# Patient Record
Sex: MALE | Race: WHITE | NOT HISPANIC OR LATINO | Employment: OTHER | URBAN - METROPOLITAN AREA
[De-identification: names, ages, dates, MRNs, and addresses within clinical notes are randomized per-mention and may not be internally consistent; named-entity substitution may affect disease eponyms.]

---

## 2024-07-27 ENCOUNTER — APPOINTMENT (OUTPATIENT)
Dept: GENERAL RADIOLOGY | Facility: HOSPITAL | Age: 64
End: 2024-07-27
Payer: MEDICARE

## 2024-07-27 ENCOUNTER — HOSPITAL ENCOUNTER (INPATIENT)
Facility: HOSPITAL | Age: 64
LOS: 6 days | Discharge: HOME | End: 2024-08-02
Attending: FAMILY MEDICINE | Admitting: INTERNAL MEDICINE
Payer: MEDICARE

## 2024-07-27 DIAGNOSIS — F41.9 ANXIETY DISORDER, UNSPECIFIED TYPE: ICD-10-CM

## 2024-07-27 DIAGNOSIS — J96.22 ACUTE ON CHRONIC RESPIRATORY FAILURE WITH HYPERCAPNIA: ICD-10-CM

## 2024-07-27 DIAGNOSIS — J44.1 COPD WITH ACUTE EXACERBATION: Primary | ICD-10-CM

## 2024-07-27 DIAGNOSIS — E87.29 RESPIRATORY ACIDOSIS: ICD-10-CM

## 2024-07-27 PROBLEM — R91.1 LUNG NODULE: Status: ACTIVE | Noted: 2024-07-27

## 2024-07-27 PROBLEM — I10 HTN (HYPERTENSION): Status: ACTIVE | Noted: 2024-07-27

## 2024-07-27 PROBLEM — D64.9 ANEMIA: Status: ACTIVE | Noted: 2024-07-27

## 2024-07-27 PROBLEM — E78.5 HLD (HYPERLIPIDEMIA): Status: ACTIVE | Noted: 2024-07-27

## 2024-07-27 PROBLEM — J96.20 ACUTE-ON-CHRONIC RESPIRATORY FAILURE: Status: ACTIVE | Noted: 2024-07-27

## 2024-07-27 LAB
ALBUMIN SERPL-MCNC: 3.7 G/DL (ref 3.5–5.2)
ALBUMIN/GLOB SERPL: 2.2 G/DL
ALP SERPL-CCNC: 53 U/L (ref 39–117)
ALT SERPL W P-5'-P-CCNC: 9 U/L (ref 1–41)
ANION GAP SERPL CALCULATED.3IONS-SCNC: 3 MMOL/L (ref 5–15)
ARTERIAL PATENCY WRIST A: ABNORMAL
ARTERIAL PATENCY WRIST A: ABNORMAL
AST SERPL-CCNC: 13 U/L (ref 1–40)
ATMOSPHERIC PRESS: ABNORMAL MM[HG]
ATMOSPHERIC PRESS: ABNORMAL MM[HG]
BASE EXCESS BLDA CALC-SCNC: 8.6 MMOL/L (ref 0–2)
BASE EXCESS BLDA CALC-SCNC: 9.1 MMOL/L (ref 0–2)
BASOPHILS # BLD AUTO: 0.01 10*3/MM3 (ref 0–0.2)
BASOPHILS NFR BLD AUTO: 0.1 % (ref 0–1.5)
BDY SITE: ABNORMAL
BDY SITE: ABNORMAL
BILIRUB SERPL-MCNC: 0.3 MG/DL (ref 0–1.2)
BODY TEMPERATURE: 37
BODY TEMPERATURE: 37
BUN SERPL-MCNC: 9 MG/DL (ref 8–23)
BUN/CREAT SERPL: 18.4 (ref 7–25)
CALCIUM SPEC-SCNC: 8.5 MG/DL (ref 8.6–10.5)
CHLORIDE SERPL-SCNC: 94 MMOL/L (ref 98–107)
CO2 BLDA-SCNC: 39.5 MMOL/L (ref 22–33)
CO2 BLDA-SCNC: 40.3 MMOL/L (ref 22–33)
CO2 SERPL-SCNC: 39 MMOL/L (ref 22–29)
COHGB MFR BLD: 1.4 % (ref 0–2)
COHGB MFR BLD: 1.4 % (ref 0–2)
CREAT SERPL-MCNC: 0.49 MG/DL (ref 0.76–1.27)
DEPRECATED RDW RBC AUTO: 44.3 FL (ref 37–54)
EGFRCR SERPLBLD CKD-EPI 2021: 116 ML/MIN/1.73
EOSINOPHIL # BLD AUTO: 0.02 10*3/MM3 (ref 0–0.4)
EOSINOPHIL NFR BLD AUTO: 0.2 % (ref 0.3–6.2)
ERYTHROCYTE [DISTWIDTH] IN BLOOD BY AUTOMATED COUNT: 13.2 % (ref 12.3–15.4)
GLOBULIN UR ELPH-MCNC: 1.7 GM/DL
GLUCOSE SERPL-MCNC: 126 MG/DL (ref 65–99)
HCO3 BLDA-SCNC: 37.2 MMOL/L (ref 20–26)
HCO3 BLDA-SCNC: 37.9 MMOL/L (ref 20–26)
HCT VFR BLD AUTO: 30.7 % (ref 37.5–51)
HCT VFR BLD CALC: 32.1 % (ref 38–51)
HCT VFR BLD CALC: 33 % (ref 38–51)
HGB BLD-MCNC: 10.2 G/DL (ref 13–17.7)
HGB BLDA-MCNC: 10.5 G/DL (ref 13.5–17.5)
HGB BLDA-MCNC: 10.8 G/DL (ref 13.5–17.5)
IMM GRANULOCYTES # BLD AUTO: 0.04 10*3/MM3 (ref 0–0.05)
IMM GRANULOCYTES NFR BLD AUTO: 0.4 % (ref 0–0.5)
INHALED O2 CONCENTRATION: 28 %
INHALED O2 CONCENTRATION: 28 %
LYMPHOCYTES # BLD AUTO: 0.4 10*3/MM3 (ref 0.7–3.1)
LYMPHOCYTES NFR BLD AUTO: 4.4 % (ref 19.6–45.3)
Lab: ABNORMAL
Lab: ABNORMAL
MCH RBC QN AUTO: 30.2 PG (ref 26.6–33)
MCHC RBC AUTO-ENTMCNC: 33.2 G/DL (ref 31.5–35.7)
MCV RBC AUTO: 90.8 FL (ref 79–97)
METHGB BLD QL: 0 % (ref 0–1.5)
METHGB BLD QL: 0.3 % (ref 0–1.5)
MODALITY: ABNORMAL
MODALITY: ABNORMAL
MONOCYTES # BLD AUTO: 0.67 10*3/MM3 (ref 0.1–0.9)
MONOCYTES NFR BLD AUTO: 7.5 % (ref 5–12)
NEUTROPHILS NFR BLD AUTO: 7.85 10*3/MM3 (ref 1.7–7)
NEUTROPHILS NFR BLD AUTO: 87.4 % (ref 42.7–76)
NOTIFIED BY: ABNORMAL
NOTIFIED BY: ABNORMAL
NOTIFIED WHO: ABNORMAL
NOTIFIED WHO: ABNORMAL
NRBC BLD AUTO-RTO: 0 /100 WBC (ref 0–0.2)
OXYHGB MFR BLDV: 95.2 % (ref 94–99)
OXYHGB MFR BLDV: 96.4 % (ref 94–99)
PCO2 BLDA: 76.1 MM HG (ref 35–45)
PCO2 BLDA: 77.8 MM HG (ref 35–45)
PCO2 TEMP ADJ BLD: 76.1 MM HG (ref 35–48)
PCO2 TEMP ADJ BLD: 77.8 MM HG (ref 35–48)
PH BLDA: 7.3 PH UNITS (ref 7.35–7.45)
PH BLDA: 7.3 PH UNITS (ref 7.35–7.45)
PH, TEMP CORRECTED: 7.3 PH UNITS
PH, TEMP CORRECTED: 7.3 PH UNITS
PLATELET # BLD AUTO: 244 10*3/MM3 (ref 140–450)
PMV BLD AUTO: 9.7 FL (ref 6–12)
PO2 BLDA: 80 MM HG (ref 83–108)
PO2 BLDA: 94.5 MM HG (ref 83–108)
PO2 TEMP ADJ BLD: 80 MM HG (ref 83–108)
PO2 TEMP ADJ BLD: 94.5 MM HG (ref 83–108)
POTASSIUM SERPL-SCNC: 3.5 MMOL/L (ref 3.5–5.2)
PROCALCITONIN SERPL-MCNC: 0.05 NG/ML (ref 0–0.25)
PROT SERPL-MCNC: 5.4 G/DL (ref 6–8.5)
RBC # BLD AUTO: 3.38 10*6/MM3 (ref 4.14–5.8)
SODIUM SERPL-SCNC: 136 MMOL/L (ref 136–145)
VENTILATOR MODE: ABNORMAL
VENTILATOR MODE: ABNORMAL
WBC NRBC COR # BLD AUTO: 8.99 10*3/MM3 (ref 3.4–10.8)

## 2024-07-27 PROCEDURE — 82375 ASSAY CARBOXYHB QUANT: CPT

## 2024-07-27 PROCEDURE — 94799 UNLISTED PULMONARY SVC/PX: CPT

## 2024-07-27 PROCEDURE — 82805 BLOOD GASES W/O2 SATURATION: CPT

## 2024-07-27 PROCEDURE — 36600 WITHDRAWAL OF ARTERIAL BLOOD: CPT

## 2024-07-27 PROCEDURE — 99223 1ST HOSP IP/OBS HIGH 75: CPT | Performed by: INTERNAL MEDICINE

## 2024-07-27 PROCEDURE — 80053 COMPREHEN METABOLIC PANEL: CPT | Performed by: INTERNAL MEDICINE

## 2024-07-27 PROCEDURE — 83050 HGB METHEMOGLOBIN QUAN: CPT

## 2024-07-27 PROCEDURE — 25010000002 METHYLPREDNISOLONE PER 125 MG: Performed by: INTERNAL MEDICINE

## 2024-07-27 PROCEDURE — 71045 X-RAY EXAM CHEST 1 VIEW: CPT

## 2024-07-27 PROCEDURE — 84145 PROCALCITONIN (PCT): CPT | Performed by: INTERNAL MEDICINE

## 2024-07-27 PROCEDURE — 94761 N-INVAS EAR/PLS OXIMETRY MLT: CPT

## 2024-07-27 PROCEDURE — 25010000002 HEPARIN (PORCINE) PER 1000 UNITS: Performed by: INTERNAL MEDICINE

## 2024-07-27 PROCEDURE — 94660 CPAP INITIATION&MGMT: CPT

## 2024-07-27 PROCEDURE — 85025 COMPLETE CBC W/AUTO DIFF WBC: CPT | Performed by: INTERNAL MEDICINE

## 2024-07-27 RX ORDER — CLONAZEPAM 0.5 MG/1
0.25 TABLET ORAL DAILY PRN
Status: DISCONTINUED | OUTPATIENT
Start: 2024-07-27 | End: 2024-07-28

## 2024-07-27 RX ORDER — POLYETHYLENE GLYCOL 3350 17 G/17G
17 POWDER, FOR SOLUTION ORAL DAILY
COMMUNITY

## 2024-07-27 RX ORDER — ACETAMINOPHEN 160 MG/5ML
650 SOLUTION ORAL EVERY 4 HOURS PRN
Status: DISCONTINUED | OUTPATIENT
Start: 2024-07-27 | End: 2024-08-02 | Stop reason: HOSPADM

## 2024-07-27 RX ORDER — MEGESTROL ACETATE 20 MG/1
40 TABLET ORAL DAILY
Status: DISCONTINUED | OUTPATIENT
Start: 2024-07-27 | End: 2024-08-02 | Stop reason: HOSPADM

## 2024-07-27 RX ORDER — POLYETHYLENE GLYCOL 3350 17 G/17G
17 POWDER, FOR SOLUTION ORAL DAILY PRN
Status: DISCONTINUED | OUTPATIENT
Start: 2024-07-27 | End: 2024-08-02 | Stop reason: HOSPADM

## 2024-07-27 RX ORDER — LEVALBUTEROL INHALATION SOLUTION 0.63 MG/3ML
1 SOLUTION RESPIRATORY (INHALATION) EVERY 4 HOURS PRN
Status: ON HOLD | COMMUNITY
End: 2024-08-02

## 2024-07-27 RX ORDER — METHYLPREDNISOLONE SODIUM SUCCINATE 125 MG/2ML
60 INJECTION, POWDER, LYOPHILIZED, FOR SOLUTION INTRAMUSCULAR; INTRAVENOUS EVERY 24 HOURS
Status: DISCONTINUED | OUTPATIENT
Start: 2024-07-27 | End: 2024-07-28

## 2024-07-27 RX ORDER — DOXYCYCLINE 100 MG/1
100 CAPSULE ORAL EVERY 12 HOURS SCHEDULED
Status: COMPLETED | OUTPATIENT
Start: 2024-07-27 | End: 2024-07-31

## 2024-07-27 RX ORDER — BISACODYL 5 MG/1
5 TABLET, DELAYED RELEASE ORAL DAILY PRN
Status: DISCONTINUED | OUTPATIENT
Start: 2024-07-27 | End: 2024-08-02 | Stop reason: HOSPADM

## 2024-07-27 RX ORDER — ONDANSETRON 4 MG/1
4 TABLET, ORALLY DISINTEGRATING ORAL EVERY 6 HOURS PRN
Status: DISCONTINUED | OUTPATIENT
Start: 2024-07-27 | End: 2024-08-02 | Stop reason: HOSPADM

## 2024-07-27 RX ORDER — BISACODYL 10 MG
10 SUPPOSITORY, RECTAL RECTAL DAILY PRN
Status: DISCONTINUED | OUTPATIENT
Start: 2024-07-27 | End: 2024-08-02 | Stop reason: HOSPADM

## 2024-07-27 RX ORDER — SODIUM CHLORIDE 0.9 % (FLUSH) 0.9 %
1-10 SYRINGE (ML) INJECTION AS NEEDED
Status: DISCONTINUED | OUTPATIENT
Start: 2024-07-27 | End: 2024-08-02 | Stop reason: HOSPADM

## 2024-07-27 RX ORDER — MONTELUKAST SODIUM 10 MG/1
10 TABLET ORAL NIGHTLY
Status: DISCONTINUED | OUTPATIENT
Start: 2024-07-27 | End: 2024-08-02 | Stop reason: HOSPADM

## 2024-07-27 RX ORDER — HEPARIN SODIUM 5000 [USP'U]/ML
5000 INJECTION, SOLUTION INTRAVENOUS; SUBCUTANEOUS EVERY 8 HOURS SCHEDULED
Status: DISCONTINUED | OUTPATIENT
Start: 2024-07-27 | End: 2024-08-02 | Stop reason: HOSPADM

## 2024-07-27 RX ORDER — ACETAMINOPHEN 325 MG/1
650 TABLET ORAL EVERY 4 HOURS PRN
Status: DISCONTINUED | OUTPATIENT
Start: 2024-07-27 | End: 2024-08-02 | Stop reason: HOSPADM

## 2024-07-27 RX ORDER — SODIUM CHLORIDE 0.9 % (FLUSH) 0.9 %
10 SYRINGE (ML) INJECTION EVERY 12 HOURS SCHEDULED
Status: DISCONTINUED | OUTPATIENT
Start: 2024-07-27 | End: 2024-08-02 | Stop reason: HOSPADM

## 2024-07-27 RX ORDER — LACTULOSE 10 G/15ML
30 SOLUTION ORAL DAILY PRN
Status: DISCONTINUED | OUTPATIENT
Start: 2024-07-27 | End: 2024-08-02 | Stop reason: HOSPADM

## 2024-07-27 RX ORDER — IPRATROPIUM BROMIDE AND ALBUTEROL SULFATE 2.5; .5 MG/3ML; MG/3ML
3 SOLUTION RESPIRATORY (INHALATION)
Status: DISCONTINUED | OUTPATIENT
Start: 2024-07-27 | End: 2024-08-02 | Stop reason: HOSPADM

## 2024-07-27 RX ORDER — AMOXICILLIN 250 MG
2 CAPSULE ORAL 2 TIMES DAILY PRN
Status: DISCONTINUED | OUTPATIENT
Start: 2024-07-27 | End: 2024-08-02 | Stop reason: HOSPADM

## 2024-07-27 RX ORDER — ALBUTEROL SULFATE 2.5 MG/3ML
2.5 SOLUTION RESPIRATORY (INHALATION) EVERY 6 HOURS PRN
Status: DISCONTINUED | OUTPATIENT
Start: 2024-07-27 | End: 2024-08-02 | Stop reason: HOSPADM

## 2024-07-27 RX ORDER — MEGESTROL ACETATE 40 MG/1
40 TABLET ORAL DAILY
COMMUNITY

## 2024-07-27 RX ORDER — MONTELUKAST SODIUM 10 MG/1
10 TABLET ORAL NIGHTLY
COMMUNITY

## 2024-07-27 RX ORDER — VILAZODONE HYDROCHLORIDE 40 MG/1
20 TABLET ORAL DAILY
Status: DISCONTINUED | OUTPATIENT
Start: 2024-07-27 | End: 2024-08-02 | Stop reason: HOSPADM

## 2024-07-27 RX ORDER — TIOTROPIUM BROMIDE 18 UG/1
1 CAPSULE ORAL; RESPIRATORY (INHALATION)
COMMUNITY

## 2024-07-27 RX ORDER — CLONAZEPAM 0.5 MG/1
0.5 TABLET ORAL 2 TIMES DAILY PRN
Status: ON HOLD | COMMUNITY
End: 2024-08-02

## 2024-07-27 RX ORDER — LEVALBUTEROL TARTRATE 45 UG/1
1-2 AEROSOL, METERED ORAL EVERY 4 HOURS PRN
COMMUNITY

## 2024-07-27 RX ORDER — LISINOPRIL 10 MG/1
10 TABLET ORAL DAILY
Status: DISCONTINUED | OUTPATIENT
Start: 2024-07-28 | End: 2024-08-02 | Stop reason: HOSPADM

## 2024-07-27 RX ORDER — ONDANSETRON 2 MG/ML
4 INJECTION INTRAMUSCULAR; INTRAVENOUS EVERY 6 HOURS PRN
Status: DISCONTINUED | OUTPATIENT
Start: 2024-07-27 | End: 2024-08-02 | Stop reason: HOSPADM

## 2024-07-27 RX ORDER — LISINOPRIL 10 MG/1
10 TABLET ORAL DAILY
COMMUNITY

## 2024-07-27 RX ORDER — SODIUM CHLORIDE 9 MG/ML
40 INJECTION, SOLUTION INTRAVENOUS AS NEEDED
Status: DISCONTINUED | OUTPATIENT
Start: 2024-07-27 | End: 2024-08-02 | Stop reason: HOSPADM

## 2024-07-27 RX ORDER — LACTULOSE 10 G/15ML
30 SOLUTION ORAL EVERY 4 HOURS
Status: DISPENSED | OUTPATIENT
Start: 2024-07-27 | End: 2024-07-27

## 2024-07-27 RX ORDER — VILAZODONE HYDROCHLORIDE 20 MG/1
20 TABLET ORAL DAILY
COMMUNITY

## 2024-07-27 RX ORDER — ACETAMINOPHEN 650 MG/1
650 SUPPOSITORY RECTAL EVERY 4 HOURS PRN
Status: DISCONTINUED | OUTPATIENT
Start: 2024-07-27 | End: 2024-08-02 | Stop reason: HOSPADM

## 2024-07-27 RX ADMIN — HEPARIN SODIUM 5000 UNITS: 5000 INJECTION INTRAVENOUS; SUBCUTANEOUS at 13:05

## 2024-07-27 RX ADMIN — MONTELUKAST 10 MG: 10 TABLET, FILM COATED ORAL at 20:33

## 2024-07-27 RX ADMIN — DOXYCYCLINE 100 MG: 100 CAPSULE ORAL at 13:05

## 2024-07-27 RX ADMIN — MEGESTROL ACETATE 40 MG: 20 TABLET ORAL at 13:06

## 2024-07-27 RX ADMIN — IPRATROPIUM BROMIDE AND ALBUTEROL SULFATE 3 ML: 2.5; .5 SOLUTION RESPIRATORY (INHALATION) at 16:23

## 2024-07-27 RX ADMIN — DOXYCYCLINE 100 MG: 100 CAPSULE ORAL at 20:33

## 2024-07-27 RX ADMIN — Medication 10 ML: at 13:20

## 2024-07-27 RX ADMIN — METHYLPREDNISOLONE SODIUM SUCCINATE 60 MG: 125 INJECTION, POWDER, FOR SOLUTION INTRAMUSCULAR; INTRAVENOUS at 13:05

## 2024-07-27 RX ADMIN — Medication 10 ML: at 20:33

## 2024-07-27 RX ADMIN — VILAZODONE HYDROCHLORIDE 20 MG: 40 TABLET, FILM COATED ORAL at 13:19

## 2024-07-27 RX ADMIN — HEPARIN SODIUM 5000 UNITS: 5000 INJECTION INTRAVENOUS; SUBCUTANEOUS at 23:03

## 2024-07-27 RX ADMIN — LACTULOSE 30 G: 20 SOLUTION ORAL at 13:04

## 2024-07-27 RX ADMIN — IPRATROPIUM BROMIDE AND ALBUTEROL SULFATE 3 ML: 2.5; .5 SOLUTION RESPIRATORY (INHALATION) at 18:58

## 2024-07-27 NOTE — H&P
"    [facility] Medicine Services  HISTORY AND PHYSICAL    Patient Name: Anshu Engel  : 1960  MRN: [id number]  Primary Care Physician: Provider, No Known  Date of admission: 2024      Subjective   Subjective     Chief Complaint:  SOA    HPI:  Anshu Engel is a 64 y.o. male with PMH of HTN, HLD, known (non- cancerous) lung nodules (previously followed by Dr. Heck at ), and COPD on 2L BNC at baseline who presented as a direct admit from [facility] per family request.  Pt is a poor historian currently and just seems fixated on having a BM and getting a shower- most of the history is obtained via phone conversation with patient's fiance.  Per fiance, pt has had increasing O2 demands \"for awhile\" and he will randomly change his O2 either up or down- she's not sure when nor how often he has been doing this.  On the day of admission to the OSH (24), pt was more lethargic and confused, so she brought him to the ED. He was found to have a significant respiratory acidosis/hypercapnia with CO2 of >100. He was placed on Bipap and improved.  Since admission, he had been improving on intermittent bipap.  Family just wanted him at [facility] as they prefer this facility.  Of note, no infectious etiology was found at the OSH.      Personal History     Past Medical History:   Diagnosis Date    Allergic     Elevated cholesterol            Past Surgical History:   Procedure Laterality Date    ANKLE SURGERY Left     fracture repair       Family History: family history includes Heart disease in his father and mother.     Social History:  reports that he quit smoking about 6 months ago. His smoking use included cigarettes. He has been exposed to tobacco smoke. He does not have any smokeless tobacco history on file. He reports that he does not currently use alcohol after a past usage of about 12.0 standard drinks of alcohol per week. He reports that he does not currently " use drugs.  Social History     Social History Narrative    Not on file       Medications:  Available home medication information reviewed.  clonazePAM, levalbuterol, lisinopril, megestrol, montelukast, polyethylene glycol, tiotropium, and vilazodone    Allergies   Allergen Reactions    Penicillins Rash       Objective   Objective     Vital Signs:   Temp:  [98.9 °F (37.2 °C)] 98.9 °F (37.2 °C)  Heart Rate:  [81-88] 81  Resp:  [16] 16  BP: (108)/(70) 108/70  Flow (L/min):  [2] 2       Physical Exam   Constitutional: Awake, alert, chronically ill appearing WM on Bipap  Eyes: PERRLA, sclerae anicteric, no conjunctival injection  HENT: NCAT, mucous membranes moist  Neck: Supple, no thyromegaly, no lymphadenopathy, trachea midline  Respiratory: Clear to auscultation bilaterally, nonlabored respirations   Cardiovascular: RRR, no murmurs, rubs, or gallops, palpable pedal pulses bilaterally  Gastrointestinal: Positive bowel sounds, soft, nontender, nondistended  Musculoskeletal: No bilateral ankle edema, no clubbing or cyanosis to extremities  Psychiatric: Appropriate affect, cooperative  Neurologic: Oriented x 3, strength symmetric in all extremities, Cranial Nerves grossly intact to confrontation, speech clear  Skin: No rashes     Result Review:  I have personally reviewed the results from the time of this admission to 7/27/2024 12:12 EDT and agree with these findings:  [x]  Laboratory list / accordion  [x]  Microbiology  [x]  Radiology  []  EKG/Telemetry   []  Cardiology/Vascular   []  Pathology  [x]  Old records  []  Other:  Most notable findings include: see assessment and plan       LAB RESULTS:      Lab 07/27/24  1059   WBC 8.99   HEMOGLOBIN 10.2*   HEMATOCRIT 30.7*   PLATELETS 244   NEUTROS ABS 7.85*   IMMATURE GRANS (ABS) 0.04   LYMPHS ABS 0.40*   MONOS ABS 0.67   EOS ABS 0.02   MCV 90.8   PROCALCITONIN 0.05         Lab 07/27/24  1059   SODIUM 136   POTASSIUM 3.5   CHLORIDE 94*   CO2 39.0*   ANION GAP 3.0*   BUN 9    CREATININE 0.49*   EGFR 116.0   GLUCOSE 126*   CALCIUM 8.5*         Lab 07/27/24  1059   TOTAL PROTEIN 5.4*   ALBUMIN 3.7   GLOBULIN 1.7   ALT (SGPT) 9   AST (SGOT) 13   BILIRUBIN 0.3   ALK PHOS 53                     Lab 07/27/24  1059   PH, ARTERIAL 7.297*   PCO2, ARTERIAL 76.1*   PO2 ART 80.0*   FIO2 28   HCO3 ART 37.2*   BASE EXCESS ART 8.6*   CARBOXYHEMOGLOBIN 1.4         Microbiology Results (last 10 days)       ** No results found for the last 240 hours. **            XR Chest 1 View    Result Date: 7/27/2024  XR CHEST 1 VW Date of Exam: 7/27/2024 10:59 AM EDT Indication: hypoxia Comparison: None available. Findings: Unremarkable cardiomediastinal silhouette. There are chronic parenchymal lung changes. No focal airspace consolidation, pleural effusion, or pneumothorax. Healed right posterior rib fractures. No acute osseous abnormality.     Impression: Impression: Chronic findings as above. No acute cardiopulmonary abnormality. Electronically Signed: Daniel Pabon MD  7/27/2024 11:14 AM EDT  Workstation ID: [id number]         Assessment & Plan   Assessment & Plan       COPD with acute exacerbation    Acute on chronic respiratory failure with hypercapnia    Respiratory acidosis    Anemia    HTN (hypertension)    HLD (hyperlipidemia)    Lung nodule    Acute-on-chronic respiratory failure      Mr. Flowers is a 65 yo WM with PMH of HTN, HLD, known (non- cancerous) lung nodules (previously followed by Dr. Heck at ), and COPD on 2L BNC at baseline who presented as a direct admit from [facility] per family request.    Plan:    Acute on chronic hypercapnic/hypoxic respiratory failure  Respiratory acidosis  COPD with acute exacerbation  -- pH of 7.29 on admission with pCO2 in the 70s  -- Bipap for time being, repeat ABG at 2pm and wean off Bipap as tolerated  -- family requested Pulmonary consult, this has been placed for the am  -- continue with steroids (will do IV for now), doxycycline for  antiinflammatory effects and scheduled and PRN Duonebs  -- consult Palliative for GOC. Pt is DNR/DNI per fiance (he was unable to tell me and deferred to her)    HTN  HLD  -- continue home meds    Chronic Benzo use  -- apparently his psychiatrist has been weaning him off his Clonazepam in the recent months. He was on 0.5 mg BID at time of admission to OSH, however, UDS was negative at that time. Suspect some element of withdrawal from benzos (suspect he took more than he was prescribed and then was withdrawing?) upon admission to OSH  -- will continue with Clonazepam 0.25 mg daily PRN for now and would taper off as able    Mood Disorder  -- continue Trintellix from home meds    Total time spent: 70 minutes  Time spent includes time reviewing chart, face-to-face time, counseling patient/family/caregiver, ordering medications/tests/procedures, communicating with other health care professionals, documenting clinical information in the electronic health record, and coordination of care.       VTE Prophylaxis:  Pharmacologic VTE prophylaxis orders are present.          CODE STATUS:    Code Status and Medical Interventions: No CPR (Do Not Attempt to Resuscitate); Limited Support; No intubation (DNI), No vasopressors, No dialysis, No cardioversion, No artificial nutrition   Ordered at: 07/27/24 1210     Medical Intervention Limits:    No intubation (DNI)    No vasopressors    No dialysis    No cardioversion    No artificial nutrition     Level Of Support Discussed With:    Health Care Surrogate     Code Status (Patient has no pulse and is not breathing):    No CPR (Do Not Attempt to Resuscitate)     Medical Interventions (Patient has pulse or is breathing):    Limited Support       Expected Discharge   Expected Discharge Date: 7/30/2024; Expected Discharge Time:      Mita Cruz MD  07/27/24

## 2024-07-28 LAB
ANION GAP SERPL CALCULATED.3IONS-SCNC: 6 MMOL/L (ref 5–15)
ARTERIAL PATENCY WRIST A: POSITIVE
ATMOSPHERIC PRESS: ABNORMAL MM[HG]
BASE EXCESS BLDA CALC-SCNC: 11.9 MMOL/L (ref 0–2)
BDY SITE: ABNORMAL
BODY TEMPERATURE: 37
BUN SERPL-MCNC: 9 MG/DL (ref 8–23)
BUN/CREAT SERPL: 15.8 (ref 7–25)
CALCIUM SPEC-SCNC: 8.8 MG/DL (ref 8.6–10.5)
CHLORIDE SERPL-SCNC: 93 MMOL/L (ref 98–107)
CHOLEST SERPL-MCNC: 177 MG/DL (ref 0–200)
CO2 BLDA-SCNC: 40.1 MMOL/L (ref 22–33)
CO2 SERPL-SCNC: 38 MMOL/L (ref 22–29)
COHGB MFR BLD: 1.4 % (ref 0–2)
CREAT SERPL-MCNC: 0.57 MG/DL (ref 0.76–1.27)
DEPRECATED RDW RBC AUTO: 41.7 FL (ref 37–54)
EGFRCR SERPLBLD CKD-EPI 2021: 110.8 ML/MIN/1.73
EPAP: 6
ERYTHROCYTE [DISTWIDTH] IN BLOOD BY AUTOMATED COUNT: 13 % (ref 12.3–15.4)
GLUCOSE SERPL-MCNC: 99 MG/DL (ref 65–99)
HBA1C MFR BLD: 4.5 % (ref 4.8–5.6)
HCO3 BLDA-SCNC: 38.3 MMOL/L (ref 20–26)
HCT VFR BLD AUTO: 29.3 % (ref 37.5–51)
HCT VFR BLD CALC: 31.5 % (ref 38–51)
HDLC SERPL-MCNC: 50 MG/DL (ref 40–60)
HGB BLD-MCNC: 10 G/DL (ref 13–17.7)
HGB BLDA-MCNC: 10.3 G/DL (ref 13.5–17.5)
INHALED O2 CONCENTRATION: 28 %
IPAP: 18
LDLC SERPL CALC-MCNC: 109 MG/DL (ref 0–100)
LDLC/HDLC SERPL: 2.14 {RATIO}
MCH RBC QN AUTO: 30 PG (ref 26.6–33)
MCHC RBC AUTO-ENTMCNC: 34.1 G/DL (ref 31.5–35.7)
MCV RBC AUTO: 88 FL (ref 79–97)
METHGB BLD QL: 0.1 % (ref 0–1.5)
MODALITY: ABNORMAL
OXYHGB MFR BLDV: 96.1 % (ref 94–99)
PAW @ PEAK INSP FLOW SETTING VENT: 0 CMH2O
PCO2 BLDA: 59 MM HG (ref 35–45)
PCO2 TEMP ADJ BLD: 59 MM HG (ref 35–48)
PH BLDA: 7.42 PH UNITS (ref 7.35–7.45)
PH, TEMP CORRECTED: 7.42 PH UNITS
PLATELET # BLD AUTO: 271 10*3/MM3 (ref 140–450)
PMV BLD AUTO: 9.9 FL (ref 6–12)
PO2 BLDA: 80.6 MM HG (ref 83–108)
PO2 TEMP ADJ BLD: 80.6 MM HG (ref 83–108)
POTASSIUM SERPL-SCNC: 3.4 MMOL/L (ref 3.5–5.2)
POTASSIUM SERPL-SCNC: 4.1 MMOL/L (ref 3.5–5.2)
RBC # BLD AUTO: 3.33 10*6/MM3 (ref 4.14–5.8)
SODIUM SERPL-SCNC: 137 MMOL/L (ref 136–145)
TOTAL RATE: 14 BREATHS/MINUTE
TRIGL SERPL-MCNC: 99 MG/DL (ref 0–150)
TSH SERPL DL<=0.05 MIU/L-ACNC: 0.56 UIU/ML (ref 0.27–4.2)
VLDLC SERPL-MCNC: 18 MG/DL (ref 5–40)
WBC NRBC COR # BLD AUTO: 8.08 10*3/MM3 (ref 3.4–10.8)

## 2024-07-28 PROCEDURE — 99233 SBSQ HOSP IP/OBS HIGH 50: CPT | Performed by: INTERNAL MEDICINE

## 2024-07-28 PROCEDURE — 82375 ASSAY CARBOXYHB QUANT: CPT

## 2024-07-28 PROCEDURE — 93010 ELECTROCARDIOGRAM REPORT: CPT | Performed by: INTERNAL MEDICINE

## 2024-07-28 PROCEDURE — 94761 N-INVAS EAR/PLS OXIMETRY MLT: CPT

## 2024-07-28 PROCEDURE — 99221 1ST HOSP IP/OBS SF/LOW 40: CPT | Performed by: INTERNAL MEDICINE

## 2024-07-28 PROCEDURE — 83036 HEMOGLOBIN GLYCOSYLATED A1C: CPT | Performed by: INTERNAL MEDICINE

## 2024-07-28 PROCEDURE — 82805 BLOOD GASES W/O2 SATURATION: CPT

## 2024-07-28 PROCEDURE — 85027 COMPLETE CBC AUTOMATED: CPT | Performed by: INTERNAL MEDICINE

## 2024-07-28 PROCEDURE — 80061 LIPID PANEL: CPT | Performed by: INTERNAL MEDICINE

## 2024-07-28 PROCEDURE — 84443 ASSAY THYROID STIM HORMONE: CPT | Performed by: INTERNAL MEDICINE

## 2024-07-28 PROCEDURE — 93005 ELECTROCARDIOGRAM TRACING: CPT | Performed by: INTERNAL MEDICINE

## 2024-07-28 PROCEDURE — 94664 DEMO&/EVAL PT USE INHALER: CPT

## 2024-07-28 PROCEDURE — 84132 ASSAY OF SERUM POTASSIUM: CPT | Performed by: INTERNAL MEDICINE

## 2024-07-28 PROCEDURE — 36600 WITHDRAWAL OF ARTERIAL BLOOD: CPT

## 2024-07-28 PROCEDURE — 94660 CPAP INITIATION&MGMT: CPT

## 2024-07-28 PROCEDURE — 80048 BASIC METABOLIC PNL TOTAL CA: CPT | Performed by: INTERNAL MEDICINE

## 2024-07-28 PROCEDURE — 25010000002 HEPARIN (PORCINE) PER 1000 UNITS: Performed by: INTERNAL MEDICINE

## 2024-07-28 PROCEDURE — 94799 UNLISTED PULMONARY SVC/PX: CPT

## 2024-07-28 PROCEDURE — 25810000003 SODIUM CHLORIDE 0.9 % SOLUTION: Performed by: PHYSICIAN ASSISTANT

## 2024-07-28 PROCEDURE — 83050 HGB METHEMOGLOBIN QUAN: CPT

## 2024-07-28 PROCEDURE — 63710000001 PREDNISONE PER 1 MG: Performed by: INTERNAL MEDICINE

## 2024-07-28 RX ORDER — CLONAZEPAM 0.5 MG/1
0.5 TABLET ORAL 2 TIMES DAILY
Status: DISCONTINUED | OUTPATIENT
Start: 2024-07-28 | End: 2024-08-02 | Stop reason: HOSPADM

## 2024-07-28 RX ORDER — PREDNISONE 20 MG/1
40 TABLET ORAL
Status: DISCONTINUED | OUTPATIENT
Start: 2024-07-28 | End: 2024-07-29

## 2024-07-28 RX ORDER — POTASSIUM CHLORIDE 750 MG/1
40 CAPSULE, EXTENDED RELEASE ORAL EVERY 4 HOURS
Status: COMPLETED | OUTPATIENT
Start: 2024-07-28 | End: 2024-07-28

## 2024-07-28 RX ADMIN — IPRATROPIUM BROMIDE AND ALBUTEROL SULFATE 3 ML: 2.5; .5 SOLUTION RESPIRATORY (INHALATION) at 07:43

## 2024-07-28 RX ADMIN — DOXYCYCLINE 100 MG: 100 CAPSULE ORAL at 20:20

## 2024-07-28 RX ADMIN — CLONAZEPAM 0.5 MG: 0.5 TABLET ORAL at 09:30

## 2024-07-28 RX ADMIN — DOXYCYCLINE 100 MG: 100 CAPSULE ORAL at 08:40

## 2024-07-28 RX ADMIN — Medication 10 ML: at 08:41

## 2024-07-28 RX ADMIN — POTASSIUM CHLORIDE 40 MEQ: 750 CAPSULE, EXTENDED RELEASE ORAL at 14:12

## 2024-07-28 RX ADMIN — MONTELUKAST 10 MG: 10 TABLET, FILM COATED ORAL at 20:20

## 2024-07-28 RX ADMIN — LISINOPRIL 10 MG: 10 TABLET ORAL at 08:41

## 2024-07-28 RX ADMIN — POLYETHYLENE GLYCOL 3350 17 G: 17 POWDER, FOR SOLUTION ORAL at 08:41

## 2024-07-28 RX ADMIN — MEGESTROL ACETATE 40 MG: 20 TABLET ORAL at 08:41

## 2024-07-28 RX ADMIN — POTASSIUM CHLORIDE 40 MEQ: 750 CAPSULE, EXTENDED RELEASE ORAL at 09:30

## 2024-07-28 RX ADMIN — TIOTROPIUM BROMIDE INHALATION SPRAY 2 PUFF: 3.12 SPRAY, METERED RESPIRATORY (INHALATION) at 07:43

## 2024-07-28 RX ADMIN — HEPARIN SODIUM 5000 UNITS: 5000 INJECTION INTRAVENOUS; SUBCUTANEOUS at 20:20

## 2024-07-28 RX ADMIN — VILAZODONE HYDROCHLORIDE 20 MG: 40 TABLET, FILM COATED ORAL at 09:33

## 2024-07-28 RX ADMIN — HEPARIN SODIUM 5000 UNITS: 5000 INJECTION INTRAVENOUS; SUBCUTANEOUS at 05:50

## 2024-07-28 RX ADMIN — IPRATROPIUM BROMIDE AND ALBUTEROL SULFATE 3 ML: 2.5; .5 SOLUTION RESPIRATORY (INHALATION) at 13:10

## 2024-07-28 RX ADMIN — IPRATROPIUM BROMIDE AND ALBUTEROL SULFATE 3 ML: 2.5; .5 SOLUTION RESPIRATORY (INHALATION) at 19:24

## 2024-07-28 RX ADMIN — SODIUM CHLORIDE 500 ML: 9 INJECTION, SOLUTION INTRAVENOUS at 23:42

## 2024-07-28 RX ADMIN — PREDNISONE 40 MG: 20 TABLET ORAL at 08:41

## 2024-07-28 RX ADMIN — HEPARIN SODIUM 5000 UNITS: 5000 INJECTION INTRAVENOUS; SUBCUTANEOUS at 14:15

## 2024-07-28 RX ADMIN — CLONAZEPAM 0.5 MG: 0.5 TABLET ORAL at 20:20

## 2024-07-28 RX ADMIN — IPRATROPIUM BROMIDE AND ALBUTEROL SULFATE 3 ML: 2.5; .5 SOLUTION RESPIRATORY (INHALATION) at 15:53

## 2024-07-28 RX ADMIN — SENNOSIDES AND DOCUSATE SODIUM 2 TABLET: 50; 8.6 TABLET ORAL at 08:41

## 2024-07-28 NOTE — PROGRESS NOTES
"    [facility] Medicine Services  PROGRESS NOTE    Patient Name: Anshu Engel  : 1960  MRN: [id number]    Date of Admission: 2024  Primary Care Physician: Provider, No Known    Subjective   Subjective     CC:  Confusion, hypercapnea, transfer from [place]    HPI:  Patient was admitted to [facility] originally on  - reports it was because \"someone laced by beer.\" Reports having bowel movement.   Per bedside nurse, more confused than yesterday    Per record review, admitted there and evaluated w pCO2>100 on admission. Concern for benzo withdrawal as component as well from their evaluation. Unremarkable CXR. Pulm saw there and though candidate for home NIV. Transferred here per family request. Patient's clonazepam was prescribed 0.5 mg BID prescribed  x #30 days per PDMP. Family had reported patient was being weaned off benzo but on review of PDMP seems less likely as changed from once daily up to BID on last fill. But would have run out weeks before admission per last script and fill time so unclear    Objective   Objective     Vital Signs:   Temp:  [97.2 °F (36.2 °C)-98.9 °F (37.2 °C)] 97.2 °F (36.2 °C)  Heart Rate:  [] 97  Resp:  [16-20] 16  BP: (108-152)/(70-94) 148/94  Flow (L/min):  [2] 2     Physical Exam:  Constitutional: Chronically ill appearing patient, very thin male  HENT: NCAT, mucous membranes moist  Respiratory: Diminished breath sounds bilaterally throughout, increased wob on 3-4 liters n/c w appropriate sats   Cardiovascular: RRR, no murmurs, rubs, or gallops  Gastrointestinal: Soft, nontender, nondistended  Musculoskeletal: Muscle tone significantly diminished throughout  Psychiatric: Very anxious affect, easily distractible and hard to focus  Neurologic: Alert, unclear reason for admission in discussion. Anxious. Significant tremors bilateral hands appreciated   Skin: No rashes    Results Reviewed:  LAB RESULTS:      Lab 24  0709 " 07/27/24  1059   WBC 8.08 8.99   HEMOGLOBIN 10.0* 10.2*   HEMATOCRIT 29.3* 30.7*   PLATELETS 271 244   NEUTROS ABS  --  7.85*   IMMATURE GRANS (ABS)  --  0.04   LYMPHS ABS  --  0.40*   MONOS ABS  --  0.67   EOS ABS  --  0.02   MCV 88.0 90.8   PROCALCITONIN  --  0.05         Lab 07/28/24  0709 07/27/24  1059   SODIUM 137 136   POTASSIUM 3.4* 3.5   CHLORIDE 93* 94*   CO2 38.0* 39.0*   ANION GAP 6.0 3.0*   BUN 9 9   CREATININE 0.57* 0.49*   EGFR 110.8 116.0   GLUCOSE 99 126*   CALCIUM 8.8 8.5*   TSH 0.557  --          Lab 07/27/24  1059   TOTAL PROTEIN 5.4*   ALBUMIN 3.7   GLOBULIN 1.7   ALT (SGPT) 9   AST (SGOT) 13   BILIRUBIN 0.3   ALK PHOS 53             Lab 07/28/24  0709   CHOLESTEROL 177   LDL CHOL 109*   HDL CHOL 50   TRIGLYCERIDES 99             Lab 07/28/24  0259 07/27/24  1321 07/27/24  1059   PH, ARTERIAL 7.420 7.296* 7.297*   PCO2, ARTERIAL 59.0* 77.8* 76.1*   PO2 ART 80.6* 94.5 80.0*   FIO2 28 28 28   HCO3 ART 38.3* 37.9* 37.2*   BASE EXCESS ART 11.9* 9.1* 8.6*   CARBOXYHEMOGLOBIN 1.4 1.4 1.4     Brief Urine Lab Results       None            Microbiology Results Abnormal       None            XR Chest 1 View    Result Date: 7/27/2024  XR CHEST 1 VW Date of Exam: 7/27/2024 10:59 AM EDT Indication: hypoxia Comparison: None available. Findings: Unremarkable cardiomediastinal silhouette. There are chronic parenchymal lung changes. No focal airspace consolidation, pleural effusion, or pneumothorax. Healed right posterior rib fractures. No acute osseous abnormality.     Impression: Impression: Chronic findings as above. No acute cardiopulmonary abnormality. Electronically Signed: Daniel Pabon MD  7/27/2024 11:14 AM EDT  Workstation ID: [id number]         Current medications:  Scheduled Meds:clonazePAM, 0.5 mg, Oral, BID  doxycycline, 100 mg, Oral, Q12H  heparin (porcine), 5,000 Units, Subcutaneous, Q8H  ipratropium-albuterol, 3 mL, Nebulization, 4x Daily - RT  lisinopril, 10 mg, Oral, Daily  megestrol, 40 mg,  Oral, Daily  montelukast, 10 mg, Oral, Nightly  potassium chloride, 40 mEq, Oral, Q4H  predniSONE, 40 mg, Oral, Daily With Breakfast  sodium chloride, 10 mL, Intravenous, Q12H  vilazodone, 20 mg, Oral, Daily      Continuous Infusions:   PRN Meds:.  acetaminophen **OR** acetaminophen **OR** acetaminophen    albuterol    senna-docusate sodium **AND** polyethylene glycol **AND** bisacodyl **AND** bisacodyl    Calcium Replacement - Follow Nurse / BPA Driven Protocol    lactulose    Magnesium Standard Dose Replacement - Follow Nurse / BPA Driven Protocol    ondansetron ODT **OR** ondansetron    Phosphorus Replacement - Follow Nurse / BPA Driven Protocol    Potassium Replacement - Follow Nurse / BPA Driven Protocol    sodium chloride    sodium chloride    Assessment & Plan   Assessment & Plan     Active Hospital Problems    Diagnosis  POA    **COPD with acute exacerbation [J44.1]  Yes    HTN (hypertension) [I10]  Unknown    HLD (hyperlipidemia) [E78.5]  Unknown    Lung nodule [R91.1]  Unknown    Acute on chronic respiratory failure with hypercapnia [J96.22]  Unknown    Respiratory acidosis [E87.29]  Unknown    Anemia [D64.9]  Unknown    Acute-on-chronic respiratory failure [J96.20]  Yes      Resolved Hospital Problems   No resolved problems to display.        Brief Hospital Course to date:  Anshu Engel is a 64 y.o. male w COPD on chronic 2 liters n/c, h/o granulomatous lung nodules, anxiety previously on benzos who presented from [facility] as transfer on family request after 4 day admission there for resp hypercapnea + concern for benzo withdrawal  Still confused despite improved resp hypercapnea (now compensated). Do suspect some benzo withdrawal given anxiety/tremors/timeline of last benzo fills.   Overall, feel patient baseline health status is probably quite poor from [facility] documentation and overall condition of patient's lung disease and nutritional status - so hard to tell difference from  baseline    Acute encephalopathy 2/2 hypercapnea + benzo withdrawal  Acute on chronic hypercapneic/hypoxic resp failure 2/2 COPD w exacerbation  -Patient's clonazepam was prescribed 0.5 mg BID prescribed 6/7 x #30 days per PDMP. UDS negative on admission to [facility] so very likely some withdrawal. Per alvin, stopped benzo abruptly on 7/21 by patient decision  -[facility] notes received unclear - was on benzo's there at some point w concern for withdrawal but unclear if weaned off again. Restart prior clonazepam 0.5 mg BID -will try to confirm w OP psychiatrist tmrw what prior plan was. Per family wean off plan  -in regards to pulm status, will need BIPAP v NIV at discharge. CM consult for assistance in this as well  -pulm consultation. Appears patient follows OP w [facility]-p[facility]m per their notes as recently as March 2024  -BIPAP after dinner until AM, n/c during day w sat goal 88-92%  -previously on steroids at [facility] already, will transition to prednisone 40 mg given this, on doxycycline now    Severe malnutrition - nutrition consultation pending  Significant anxiety - agitation at OSH requiring geodon, home trintellix for now  Acute constipation - improved  Former smoker - per alvin, quit x 6 months ago  HTN - home lisinopril    **Spoke to [facility] by phone on 7/28 - she reports having medical POA paperwork and she will bring it in today for scanning in here. I added son to chart for now as NOK     Expected Discharge Location and Transportation: will likely have IPR recs  Expected Discharge 8/1 (Discharge date is tentative pending patient's medical condition and is subject to change)  Expected Discharge Date: 8/1/2024; Expected Discharge Time:      VTE Prophylaxis:  Pharmacologic VTE prophylaxis orders are present.         AM-PAC 6 Clicks Score (PT): 14 (07/27/24 2028)    CODE STATUS:   Code Status and Medical Interventions: No CPR (Do Not Attempt to Resuscitate); Limited Support; No intubation (DNI), No vasopressors,  No dialysis, No cardioversion, No artificial nutrition   Ordered at: 07/27/24 1210     Medical Intervention Limits:    No intubation (DNI)    No vasopressors    No dialysis    No cardioversion    No artificial nutrition     Level Of Support Discussed With:    Health Care Surrogate     Code Status (Patient has no pulse and is not breathing):    No CPR (Do Not Attempt to Resuscitate)     Medical Interventions (Patient has pulse or is breathing):    Limited Support       Celine Fink MD  07/28/24    Level 3 note: 2/3 of the below    1) Problem  Severe exacerbation of chronic illness: acute on chronic hypercapneic resp failure 2/2 severe copd    2) Data    3 of the following:  Prior external notes reviewed: [facility] records reviewed  Test results reviewed: ABG x 3, BMP, TSH, CBC, lipid panel  History obtained from independent historian: alvin by phone for benzo issues, bedside nurse on prior mental status baseline of patient and history from fiance obtained 7/27 by phone used for summary of condition above as patient unable to perform    Test reviewed:  Tests independently interpreted, see above read: CXR 7/27 personally reviewed and interpreted: flattening of diaphragm c/w known lung disease, chronic lung changes but no focal infiltrate

## 2024-07-28 NOTE — CONSULTS
INPATIENT PULMONARY SERVICE   PROGRESS NOTE       Chief complaint: Transferred to [facility]     History of Present Illness: Madan Engel is a 64-year-old male with past medical history of chronic hypoxic respiratory failure due to COPD, granulomatous lung disease and anxiety. He was transferred from [facility] on 7/27/2024 at the request of family due to ongoing respiratory failure, hypercapnia, altered mentation and potential benzodiazepine withdrawal.    Patient currently being treated for COPD exacerbation and has improved (per both patient/family and hospitalist).  At time of evaluation on 7/28/2024 he was alert, oriented and conversational.  Respiratory failure has improved and he is currently on 3 L nasal cannula with appropriate oxygen saturations.  Utilizing NIPPV at night.    PMH: He  has a past medical history of Allergic and Elevated cholesterol.   PSxH: He  has a past surgical history that includes Ankle surgery (Left).     Allergies: He is allergic to penicillins.   FH: His family history includes Heart disease in his father and mother.   SH: He  reports that he quit smoking about 6 months ago. His smoking use included cigarettes. He has been exposed to tobacco smoke. He does not have any smokeless tobacco history on file. He reports that he does not currently use alcohol after a past usage of about 12.0 standard drinks of alcohol per week. He reports that he does not currently use drugs.     The patient's relevant past medical, surgical and social history were reviewed and updated in Epic as appropriate.      Medications:  No current facility-administered medications on file prior to encounter.     Current Outpatient Medications on File Prior to Encounter   Medication Sig    clonazePAM (KlonoPIN) 0.5 MG tablet Take 1 tablet by mouth 2 (Two) Times a Day As Needed for Anxiety.    levalbuterol (XOPENEX HFA) 45 MCG/ACT inhaler Inhale 1-2 puffs Every 4 (Four) Hours As Needed for Wheezing.     levalbuterol (XOPENEX) 0.63 MG/3ML nebulizer solution Take 1 ampule by nebulization Every 4 (Four) Hours As Needed for Wheezing.    lisinopril (PRINIVIL,ZESTRIL) 10 MG tablet Take 1 tablet by mouth Daily.    megestrol (MEGACE) 40 MG tablet Take 1 tablet by mouth Daily.    montelukast (SINGULAIR) 10 MG tablet Take 1 tablet by mouth Every Night.    polyethylene glycol (MiraLax) 17 g packet Take 17 g by mouth Daily.    tiotropium (SPIRIVA) 18 MCG per inhalation capsule Place 1 capsule into inhaler and inhale Daily.    vilazodone (VIIBRYD) 20 MG tablet tablet Take 1 tablet by mouth Daily.      Review of Systems: Fourteen point review of system performed and negative except for that mentioned in HPI.     Exam:  Vital Signs: Blood pressure 146/88, pulse 109, temperature 99.4 °F (37.4 °C), temperature source Oral, resp. rate 18, SpO2 93%.    General: The patient appears in no acute distress. Alert, cooperative and interactive.  Chest: Clear to auscultation bilaterally, No wheezing, rhonchi, or rales. Normal work of breathing. Equal chest rise.  On supplemental oxygen with appropriate saturations.   Cardiac: Normal rate, S1S2 auscultated. No murmurs, rubs or gallops.   Extremities: No lower extremity edema. No clubbing or cyanosis.  Neuro: Alert and oriented x 3.  Moves upper/lower extremities equally and bilaterally.  No focal deficits appreciated.    Results Reviewed:  - I reviewed the patient's new laboratory and imaging results.   - I independently reviewed the patient's new images.    Assessment/Plan:    Active Hospital Problems:  Active Hospital Problems    Diagnosis  POA    **COPD with acute exacerbation [J44.1]  Yes    HTN (hypertension) [I10]  Unknown    HLD (hyperlipidemia) [E78.5]  Unknown    Lung nodule [R91.1]  Unknown    Acute on chronic respiratory failure with hypercapnia [J96.22]  Unknown    Respiratory acidosis [E87.29]  Unknown    Anemia [D64.9]  Unknown    Acute-on-chronic respiratory failure [J96.20]   Yes      Resolved Hospital Problems   No resolved problems to display.     #Acute on chronic hypoxic/hypercapnic respiratory failure secondary to COPD    Continue antibiotics, steroids and scheduled nebulizers per primary team.  Per patient and family he is close to baseline and currently stable on 3L nasal cannula.  Given degree of hypercapnia patient may need NIPPV at hospital discharge. Spoke with hospitalist on 7/28 concerning this arrangement. On release should have close follow-up with outpatient pulmonary in [place], [place]    -- Horace Jara MD  Pulmonary/Critical Care    [x] Inpatient Pulmonary Consult Service

## 2024-07-28 NOTE — CONSULTS
Provider, No Known  Consulting physician: Ashley    No chief complaint on file.      Reason for consult: GOC    HPI: Patient is a 64-year-old male brought to hospital due to increasing dyspnea.  Patient states that he lives at home with his fiancée.  States that he is able to take care of most of his ADLs at home including going out and doing grocery shopping, etc.  The impression of the hospital with increasing dyspnea was found to have exacerbation of his COPD.  Patient states that his breathing seems to be doing somewhat better at this point in time.  States that he is looking at talking to pulmonology, who has been consulted.    Pain assesment: Denies    Dyspnea: Positive    N/V: Denies    PPS: 30      Past Medical History:   Diagnosis Date    Allergic     Elevated cholesterol      Past Surgical History:   Procedure Laterality Date    ANKLE SURGERY Left     fracture repair     Current Code Status       Date Active Code Status Order ID Comments User Context       7/27/2024 1210 No CPR (Do Not Attempt to Resuscitate) [id number]  Mita Cruz MD Inpatient        Question Answer    Code Status (Patient has no pulse and is not breathing) No CPR (Do Not Attempt to Resuscitate)    Medical Interventions (Patient has pulse or is breathing) Limited Support    Medical Intervention Limits: No intubation (DNI)     No vasopressors     No dialysis     No cardioversion     No artificial nutrition    Level Of Support Discussed With Health Care Surrogate                  Current Facility-Administered Medications   Medication Dose Route Frequency Provider Last Rate Last Admin    acetaminophen (TYLENOL) tablet 650 mg  650 mg Oral Q4H PRN Mita Cruz MD        Or    acetaminophen (TYLENOL) 160 MG/5ML oral solution 650 mg  650 mg Oral Q4H PRN Mita Cruz MD        Or    acetaminophen (TYLENOL) suppository 650 mg  650 mg Rectal Q4H PRN Mita Cruz MD        albuterol (PROVENTIL) nebulizer  solution 0.083% 2.5 mg/3mL  2.5 mg Nebulization Q6H PRN Mita Cruz MD        sennosides-docusate (PERICOLACE) 8.6-50 MG per tablet 2 tablet  2 tablet Oral BID PRN Mita Cruz MD   2 tablet at 07/28/24 0841    And    polyethylene glycol (MIRALAX) packet 17 g  17 g Oral Daily PRN Mita Cruz MD   17 g at 07/28/24 0841    And    bisacodyl (DULCOLAX) EC tablet 5 mg  5 mg Oral Daily PRN Mita Cruz MD        And    bisacodyl (DULCOLAX) suppository 10 mg  10 mg Rectal Daily PRN Mita Cruz MD        Calcium Replacement - Follow Nurse / BPA Driven Protocol   Does not apply PRN Mita Cruz MD        clonazePAM (KlonoPIN) tablet 0.5 mg  0.5 mg Oral BID Celine Fink MD   0.5 mg at 07/28/24 0930    doxycycline (MONODOX) capsule 100 mg  100 mg Oral Q12H Mita Cruz MD   100 mg at 07/28/24 0840    heparin (porcine) 5000 UNIT/ML injection 5,000 Units  5,000 Units Subcutaneous Q8H Mita Cruz MD   5,000 Units at 07/28/24 0550    ipratropium-albuterol (DUO-NEB) nebulizer solution 3 mL  3 mL Nebulization 4x Daily - RT Mita Cruz MD   3 mL at 07/28/24 1310    lactulose (CHRONULAC) 10 GM/15ML solution 30 g  30 g Oral Daily PRN Mita Cruz MD        lisinopril (PRINIVIL,ZESTRIL) tablet 10 mg  10 mg Oral Daily Mita Cruz MD   10 mg at 07/28/24 0841    Magnesium Standard Dose Replacement - Follow Nurse / BPA Driven Protocol   Does not apply PRN Mita Cruz MD        megestrol (MEGACE) tablet 40 mg  40 mg Oral Daily Mita Cruz MD   40 mg at 07/28/24 0841    montelukast (SINGULAIR) tablet 10 mg  10 mg Oral Nightly Mita Cruz MD   10 mg at 07/27/24 2033    ondansetron ODT (ZOFRAN-ODT) disintegrating tablet 4 mg  4 mg Oral Q6H PRN Mita Cruz MD        Or    ondansetron (ZOFRAN) injection 4 mg  4 mg Intravenous Q6H PRN Mita Cruz MD        Phosphorus  Replacement - Follow Nurse / BPA Driven Protocol   Does not apply PRN Mita Cruz MD        potassium chloride (MICRO-K/KLOR-CON) CR capsule  40 mEq Oral Q4H Celine Fink MD   40 mEq at 24 0930    Potassium Replacement - Follow Nurse / BPA Driven Protocol   Does not apply PRN Mita Cruz MD        predniSONE (DELTASONE) tablet 40 mg  40 mg Oral Daily With Breakfast Celine Fink MD   40 mg at 24 0841    sodium chloride 0.9 % flush 1-10 mL  1-10 mL Intravenous PRN Mita Cruz MD        sodium chloride 0.9 % flush 10 mL  10 mL Intravenous Q12H Mita Cruz MD   10 mL at 24 0841    sodium chloride 0.9 % infusion 40 mL  40 mL Intravenous PRN Mita Cruz MD        vilazodone (VIIBRYD) tablet 20 mg  20 mg Oral Daily Mita Cruz MD   20 mg at 24 0933          acetaminophen **OR** acetaminophen **OR** acetaminophen    albuterol    senna-docusate sodium **AND** polyethylene glycol **AND** bisacodyl **AND** bisacodyl    Calcium Replacement - Follow Nurse / BPA Driven Protocol    lactulose    Magnesium Standard Dose Replacement - Follow Nurse / BPA Driven Protocol    ondansetron ODT **OR** ondansetron    Phosphorus Replacement - Follow Nurse / BPA Driven Protocol    Potassium Replacement - Follow Nurse / BPA Driven Protocol    sodium chloride    sodium chloride  Allergies   Allergen Reactions    Penicillins Rash     Family History   Problem Relation Age of Onset    Heart disease Mother     Heart disease Father      Social History     Socioeconomic History    Marital status: Unknown   Tobacco Use    Smoking status: Former     Current packs/day: 0.00     Types: Cigarettes     Quit date: 2024     Years since quittin.5     Passive exposure: Past    Tobacco comments:     since age 10   Vaping Use    Vaping status: Never Used   Substance and Sexual Activity    Alcohol use: Not Currently     Alcohol/week: 12.0 standard drinks of alcohol      Types: 12 Cans of beer per week    Drug use: Not Currently    Sexual activity: Defer     Review of Systems -all others reviewed and are negative septa mentioned in the HPI      /88 (BP Location: Left arm, Patient Position: Lying)   Pulse 109   Temp 99.4 °F (37.4 °C) (Oral)   Resp 18   SpO2 93%     Intake/Output Summary (Last 24 hours) at 7/28/2024 1345  Last data filed at 7/28/2024 1311  Gross per 24 hour   Intake 520 ml   Output 1775 ml   Net -1255 ml     Physical Exam:      General Appearance:  Alert, cooperative, in no acute distress   Head:  Normocephalic, without obvious abnormality, atraumatic   Eyes:  Lids and lashes normal, conjunctivae and sclerae normal, no icterus, no pallor, corneas clear, PERRLA   Ears:  Ears appear intact with no abnormalities noted   Throat:  No oral lesions, no thrush, oral mucosa moist   Neck:  No adenopathy, supple, trachea midline, no thyromegaly, no carotid bruit, no JVD   Back:  No kyphosis present, no scoliosis present, no skin lesions, erythema or scars, no tenderness to percussion or palpation, range of motion normal   Lungs:  Clear to auscultation, respirations regular, even and unlabored    Heart:  Regular rhythm and normal rate, normal S1 and S2, no murmur, no gallop, no rub, no click   Chest Wall:  No abnormalities observed   Abdomen:  Normal bowel sounds, no masses, no organomegaly, soft non-tender, non-distended, no guarding, no rebound tenderness   Rectal:  Deferred   Extremities:  Moves all extremities well, no edema, no cyanosis, no redness   Pulses:  Pulses palpable and equal bilaterally   Skin:  No bleeding, bruising or rash   Lymph nodes:  No palpable adenopathy   Neurologic:  Cranial nerves 2 - 12 grossly intact, sensation intact, DTR present and equal bilaterally                                                                               Results from last 7 days   Lab Units 07/28/24  0709   WBC 10*3/mm3 8.08   HEMOGLOBIN g/dL 10.0*   HEMATOCRIT %  29.3*   PLATELETS 10*3/mm3 271     Results from last 7 days   Lab Units 07/28/24  0709 07/27/24  1059   SODIUM mmol/L 137 136   POTASSIUM mmol/L 3.4* 3.5   CHLORIDE mmol/L 93* 94*   CO2 mmol/L 38.0* 39.0*   BUN mg/dL 9 9   CREATININE mg/dL 0.57* 0.49*   CALCIUM mg/dL 8.8 8.5*   BILIRUBIN mg/dL  --  0.3   ALK PHOS U/L  --  53   ALT (SGPT) U/L  --  9   AST (SGOT) U/L  --  13   GLUCOSE mg/dL 99 126*     Results from last 7 days   Lab Units 07/28/24  0709   SODIUM mmol/L 137   POTASSIUM mmol/L 3.4*   CHLORIDE mmol/L 93*   CO2 mmol/L 38.0*   BUN mg/dL 9   CREATININE mg/dL 0.57*   GLUCOSE mg/dL 99   CALCIUM mg/dL 8.8     Imaging Results (Last 72 Hours)       Procedure Component Value Units Date/Time    XR Chest 1 View [[id number]] Collected: 07/27/24 1113     Updated: 07/27/24 1117    Narrative:      XR CHEST 1 VW    Date of Exam: 7/27/2024 10:59 AM EDT    Indication: hypoxia    Comparison: None available.    Findings:  Unremarkable cardiomediastinal silhouette. There are chronic parenchymal lung changes. No focal airspace consolidation, pleural effusion, or pneumothorax. Healed right posterior rib fractures. No acute osseous abnormality.      Impression:      Impression:  Chronic findings as above. No acute cardiopulmonary abnormality.      Electronically Signed: Daniel Pabon MD    7/27/2024 11:14 AM EDT    Workstation ID: [id number]          Impression: COPD  Dyspnea  Debility  Goals of care  Plan: Patient's CODE STATUS noted to be DNR which I agree with.  Please note that I did not have any goals of care conversation with the patient at this point time as he is currently waiting to talk to pulmonology about treatment option as well as treatment plan.        Evans Frye,   07/28/24  13:45 EDT

## 2024-07-28 NOTE — PLAN OF CARE
Goal Outcome Evaluation:  Plan of Care Reviewed With: patient        Progress: no change     Alert to self, intermittently confused throughout shift on place and situation, family at bedside this shift, supportive of patient, 3LNC, VSS, no complaints of pain, x1 assist      BIPAP placed 1600       Problem: COPD (Chronic Obstructive Pulmonary Disease) Comorbidity  Goal: Maintenance of COPD Symptom Control  Outcome: Ongoing, Progressing     Problem: Noninvasive Ventilation Acute  Goal: Effective Unassisted Ventilation and Oxygenation  Outcome: Ongoing, Progressing     Problem: Skin Injury Risk Increased  Goal: Skin Health and Integrity  Outcome: Ongoing, Progressing  Intervention: Optimize Skin Protection  Description: Perform a full pressure injury risk assessment, as indicated by screening, upon admission to care unit.  Reassess skin (injury risk, full inspection) frequently (e.g., scheduled interval, with change in condition) to provide optimal early detection and prevention.  Maintain adequate tissue perfusion (e.g., encourage fluid balance; avoid crossing legs, constrictive clothing or devices) to promote tissue oxygenation.  Maintain head of bed at lowest degree of elevation tolerated, considering medical condition and other restrictions.  Avoid positioning onto an area that remains reddened.  Minimize incontinence and moisture (e.g., toileting schedule; moisture-wicking pad, diaper or incontinence collection device; skin moisture barrier).  Cleanse skin promptly and gently when soiled utilizing a pH-balanced cleanser.  Relieve and redistribute pressure (e.g., scheduled position changes, weight shifts, use of support surface, medical device repositioning, protective dressing application, use of positioning device, microclimate control, use of pressure-injury-monitor  Encourage increased activity, such as sitting in a chair at the bedside or early mobilization, when able to tolerate.  Recent Flowsheet  Documentation  Taken 7/28/2024 1626 by Amalia Murry RN  Pressure Reduction Techniques: weight shift assistance provided  Head of Bed (HOB) Positioning: HOB at 30-45 degrees  Pressure Reduction Devices: pressure-redistributing mattress utilized  Skin Protection: adhesive use limited  Taken 7/28/2024 1445 by Amalia Murry RN  Pressure Reduction Techniques:   weight shift assistance provided   heels elevated off bed  Pressure Reduction Devices: pressure-redistributing mattress utilized  Skin Protection: adhesive use limited  Taken 7/28/2024 1217 by Amalia Murry RN  Pressure Reduction Techniques: frequent weight shift encouraged  Pressure Reduction Devices: chair cushion utilized  Skin Protection: adhesive use limited  Taken 7/28/2024 1126 by Amalia Murry RN  Pressure Reduction Devices: chair cushion utilized  Taken 7/28/2024 0841 by Amalia Murry RN  Pressure Reduction Techniques:   weight shift assistance provided   positioned off wounds   heels elevated off bed  Head of Bed (HOB) Positioning: HOB at 30-45 degrees  Pressure Reduction Devices:   pressure-redistributing mattress utilized   heel offloading device utilized   positioning supports utilized  Skin Protection:   adhesive use limited   incontinence pads utilized

## 2024-07-28 NOTE — NURSING NOTE
Vitals signs were stable. Patient reported no pain throughout the night. Patient had some confusion and was disoriented to place. Patient was voiding well . Patient slept well throughout the night keeping BIPAP in place.

## 2024-07-29 LAB
ANION GAP SERPL CALCULATED.3IONS-SCNC: 3 MMOL/L (ref 5–15)
BUN SERPL-MCNC: 14 MG/DL (ref 8–23)
BUN/CREAT SERPL: 25 (ref 7–25)
CALCIUM SPEC-SCNC: 8.9 MG/DL (ref 8.6–10.5)
CHLORIDE SERPL-SCNC: 96 MMOL/L (ref 98–107)
CO2 SERPL-SCNC: 39 MMOL/L (ref 22–29)
CREAT SERPL-MCNC: 0.56 MG/DL (ref 0.76–1.27)
D DIMER PPP FEU-MCNC: 0.31 MCGFEU/ML (ref 0–0.62)
DEPRECATED RDW RBC AUTO: 43.8 FL (ref 37–54)
EGFRCR SERPLBLD CKD-EPI 2021: 111.4 ML/MIN/1.73
ERYTHROCYTE [DISTWIDTH] IN BLOOD BY AUTOMATED COUNT: 13.2 % (ref 12.3–15.4)
GLUCOSE SERPL-MCNC: 132 MG/DL (ref 65–99)
HCT VFR BLD AUTO: 31.1 % (ref 37.5–51)
HGB BLD-MCNC: 10.1 G/DL (ref 13–17.7)
MCH RBC QN AUTO: 29.8 PG (ref 26.6–33)
MCHC RBC AUTO-ENTMCNC: 32.5 G/DL (ref 31.5–35.7)
MCV RBC AUTO: 91.7 FL (ref 79–97)
PLATELET # BLD AUTO: 280 10*3/MM3 (ref 140–450)
PMV BLD AUTO: 10.1 FL (ref 6–12)
POTASSIUM SERPL-SCNC: 3.9 MMOL/L (ref 3.5–5.2)
RBC # BLD AUTO: 3.39 10*6/MM3 (ref 4.14–5.8)
SODIUM SERPL-SCNC: 138 MMOL/L (ref 136–145)
WBC NRBC COR # BLD AUTO: 7.52 10*3/MM3 (ref 3.4–10.8)

## 2024-07-29 PROCEDURE — 94664 DEMO&/EVAL PT USE INHALER: CPT

## 2024-07-29 PROCEDURE — 94799 UNLISTED PULMONARY SVC/PX: CPT

## 2024-07-29 PROCEDURE — 99232 SBSQ HOSP IP/OBS MODERATE 35: CPT | Performed by: INTERNAL MEDICINE

## 2024-07-29 PROCEDURE — 85379 FIBRIN DEGRADATION QUANT: CPT | Performed by: INTERNAL MEDICINE

## 2024-07-29 PROCEDURE — 97161 PT EVAL LOW COMPLEX 20 MIN: CPT

## 2024-07-29 PROCEDURE — 63710000001 PREDNISONE PER 1 MG: Performed by: INTERNAL MEDICINE

## 2024-07-29 PROCEDURE — 97530 THERAPEUTIC ACTIVITIES: CPT

## 2024-07-29 PROCEDURE — 85027 COMPLETE CBC AUTOMATED: CPT | Performed by: INTERNAL MEDICINE

## 2024-07-29 PROCEDURE — 94761 N-INVAS EAR/PLS OXIMETRY MLT: CPT

## 2024-07-29 PROCEDURE — 80048 BASIC METABOLIC PNL TOTAL CA: CPT | Performed by: INTERNAL MEDICINE

## 2024-07-29 PROCEDURE — 25010000002 HEPARIN (PORCINE) PER 1000 UNITS: Performed by: INTERNAL MEDICINE

## 2024-07-29 PROCEDURE — 94660 CPAP INITIATION&MGMT: CPT

## 2024-07-29 PROCEDURE — 97535 SELF CARE MNGMENT TRAINING: CPT

## 2024-07-29 PROCEDURE — 97165 OT EVAL LOW COMPLEX 30 MIN: CPT

## 2024-07-29 PROCEDURE — 25810000003 LACTATED RINGERS SOLUTION: Performed by: INTERNAL MEDICINE

## 2024-07-29 RX ORDER — PREDNISONE 20 MG/1
40 TABLET ORAL
Status: DISCONTINUED | OUTPATIENT
Start: 2024-07-30 | End: 2024-08-01

## 2024-07-29 RX ADMIN — DOXYCYCLINE 100 MG: 100 CAPSULE ORAL at 08:26

## 2024-07-29 RX ADMIN — MONTELUKAST 10 MG: 10 TABLET, FILM COATED ORAL at 20:07

## 2024-07-29 RX ADMIN — Medication 10 ML: at 08:27

## 2024-07-29 RX ADMIN — SENNOSIDES AND DOCUSATE SODIUM 2 TABLET: 50; 8.6 TABLET ORAL at 08:26

## 2024-07-29 RX ADMIN — IPRATROPIUM BROMIDE AND ALBUTEROL SULFATE 3 ML: 2.5; .5 SOLUTION RESPIRATORY (INHALATION) at 08:11

## 2024-07-29 RX ADMIN — CLONAZEPAM 0.5 MG: 0.5 TABLET ORAL at 20:07

## 2024-07-29 RX ADMIN — LISINOPRIL 10 MG: 10 TABLET ORAL at 08:26

## 2024-07-29 RX ADMIN — IPRATROPIUM BROMIDE AND ALBUTEROL SULFATE 3 ML: 2.5; .5 SOLUTION RESPIRATORY (INHALATION) at 16:15

## 2024-07-29 RX ADMIN — HEPARIN SODIUM 5000 UNITS: 5000 INJECTION INTRAVENOUS; SUBCUTANEOUS at 06:29

## 2024-07-29 RX ADMIN — IPRATROPIUM BROMIDE AND ALBUTEROL SULFATE 3 ML: 2.5; .5 SOLUTION RESPIRATORY (INHALATION) at 12:54

## 2024-07-29 RX ADMIN — PREDNISONE 40 MG: 20 TABLET ORAL at 08:26

## 2024-07-29 RX ADMIN — DOXYCYCLINE 100 MG: 100 CAPSULE ORAL at 20:07

## 2024-07-29 RX ADMIN — HEPARIN SODIUM 5000 UNITS: 5000 INJECTION INTRAVENOUS; SUBCUTANEOUS at 13:20

## 2024-07-29 RX ADMIN — CLONAZEPAM 0.5 MG: 0.5 TABLET ORAL at 08:26

## 2024-07-29 RX ADMIN — MEGESTROL ACETATE 40 MG: 20 TABLET ORAL at 08:26

## 2024-07-29 RX ADMIN — IPRATROPIUM BROMIDE AND ALBUTEROL SULFATE 3 ML: 2.5; .5 SOLUTION RESPIRATORY (INHALATION) at 19:23

## 2024-07-29 RX ADMIN — SODIUM CHLORIDE, POTASSIUM CHLORIDE, SODIUM LACTATE AND CALCIUM CHLORIDE 500 ML: 600; 310; 30; 20 INJECTION, SOLUTION INTRAVENOUS at 12:17

## 2024-07-29 RX ADMIN — VILAZODONE HYDROCHLORIDE 20 MG: 40 TABLET, FILM COATED ORAL at 08:27

## 2024-07-29 RX ADMIN — HEPARIN SODIUM 5000 UNITS: 5000 INJECTION INTRAVENOUS; SUBCUTANEOUS at 20:07

## 2024-07-29 NOTE — PLAN OF CARE
Goal Outcome Evaluation:  Plan of Care Reviewed With: patient        Progress: improving       Alert to self, intermittent confusion, 2LNC, BP WDL, sinus tach throughout shift, LR bolus of 500ml infused,  currently, up in chair      Problem: Adult Inpatient Plan of Care  Goal: Absence of Hospital-Acquired Illness or Injury  Intervention: Identify and Manage Fall Risk  Description: Perform standard risk assessment on admission using a validated tool or comprehensive approach appropriate to the patient; reassess fall risk frequently, with change in status or transfer to another level of care.  Communicate fall injury risk to interprofessional healthcare team.  Determine need for increased observation, equipment and environmental modification, such as low bed, signage and supportive, nonskid footwear.  Adjust safety measures to individual developmental age, stage and identified risk factors.  Reinforce the importance of safety and physical activity with patient and family.  Perform regular intentional rounding to assess need for position change, pain assessment and personal needs, including assistance with toileting.  Recent Flowsheet Documentation  Taken 7/29/2024 1640 by Amalia Murry, RN  Safety Promotion/Fall Prevention:   safety round/check completed   room organization consistent   nonskid shoes/slippers when out of bed   mobility aid in reach   lighting adjusted   fall prevention program maintained   clutter free environment maintained   assistive device/personal items within reach  Taken 7/29/2024 1411 by Amalia Murry, RN  Safety Promotion/Fall Prevention:   safety round/check completed   room organization consistent   nonskid shoes/slippers when out of bed   mobility aid in reach   lighting adjusted   gait belt   fall prevention program maintained   elopement precautions   clutter free environment maintained   assistive device/personal items within reach   activity supervised  Taken 7/29/2024 1218 by  Jeanmarie, Amalia R, RN  Safety Promotion/Fall Prevention:   safety round/check completed   room organization consistent   nonskid shoes/slippers when out of bed   mobility aid in reach   lighting adjusted   gait belt   fall prevention program maintained   elopement precautions   clutter free environment maintained   assistive device/personal items within reach   activity supervised  Taken 7/29/2024 1011 by Amalia Murry, RN  Safety Promotion/Fall Prevention:   safety round/check completed   room organization consistent   nonskid shoes/slippers when out of bed   muscle strengthening facilitated   mobility aid in reach   lighting adjusted   gait belt   fall prevention program maintained   elopement precautions   clutter free environment maintained   assistive device/personal items within reach   activity supervised  Taken 7/29/2024 0826 by Amalia Murry, RN  Safety Promotion/Fall Prevention:   safety round/check completed   room organization consistent   nonskid shoes/slippers when out of bed   mobility aid in reach   lighting adjusted   fall prevention program maintained   clutter free environment maintained   assistive device/personal items within reach

## 2024-07-29 NOTE — PLAN OF CARE
Goal Outcome Evaluation:  Plan of Care Reviewed With: (P) patient           Outcome Evaluation: (P) Pt amb 20' this session with Min A  on 2L of O2 with stats reading at 93%. Pt with elevated HR throughout treatment reaching 133 during ambulation and in the 120s with sitting. Pt presents below baseline with deficits in generalized strength, balance, and poor activity tolerance. Further IPPT warrated during admission. PT recommended DC to SNF for best outcomes.      Anticipated Discharge Disposition (PT): (P) skilled nursing facility

## 2024-07-29 NOTE — THERAPY EVALUATION
Patient Name: Anshu Engel  : 1960    MRN: [id number]                              Today's Date: 2024       Admit Date: 2024    Visit Dx: No diagnosis found.  Patient Active Problem List   Diagnosis    COPD with acute exacerbation    HTN (hypertension)    HLD (hyperlipidemia)    Lung nodule    Acute on chronic respiratory failure with hypercapnia    Respiratory acidosis    Anemia    Acute-on-chronic respiratory failure     Past Medical History:   Diagnosis Date    Allergic     Elevated cholesterol      Past Surgical History:   Procedure Laterality Date    ANKLE SURGERY Left     fracture repair      General Information       Row Name 24 1526          OT Time and Intention    Document Type evaluation  -KF     Mode of Treatment occupational therapy  -       Row Name 24 1526          General Information    Prior Level of Function --  Pt is a questionable historian, unable to provide details regarding PLOF including use of AD.  -KF     Existing Precautions/Restrictions fall;oxygen therapy device and L/min;other (see comments)  tremulous  -KF     Barriers to Rehab medically complex;previous functional deficit;cognitive status  -       Row Name 24 1526          Living Environment    People in Home significant other  -       Row Name 24 1526          Home Main Entrance    Number of Stairs, Main Entrance other (see comments)  ramp access  -       Row Name 24 1526          Stairs Within Home, Primary    Stairs, Within Home, Primary Pt states he lives in a two story home, but doesn't go upstairs.  -KF     Number of Stairs, Within Home, Primary none  -       Row Name 24 1526          Cognition    Orientation Status (Cognition) oriented to;person;time;disoriented to;place;other (see comments)  conversational confusion  -       Row Name 24 1526          Safety Issues, Functional Mobility    Safety Issues Affecting Function (Mobility) awareness of  need for assistance;insight into deficits/self-awareness;judgment;positioning of assistive device;problem-solving;safety precaution awareness;safety precautions follow-through/compliance;sequencing abilities  -KF     Impairments Affecting Function (Mobility) balance;cognition;endurance/activity tolerance;shortness of breath;strength;postural/trunk control;range of motion (ROM)  -KF     Cognitive Impairments, Mobility Safety/Performance attention;awareness, need for assistance;insight into deficits/self-awareness;problem-solving/reasoning;judgment;safety precaution awareness;safety precaution follow-through;sequencing abilities  -KF               User Key  (r) = Recorded By, (t) = Taken By, (c) = Cosigned By      Initials Name Provider Type    KF Yaritza Perez OT Occupational Therapist                     Mobility/ADL's       Row Name 07/29/24 1528          Bed Mobility    Bed Mobility supine-sit  -     Supine-Sit [place] (Bed Mobility) standby assist  -     Assistive Device (Bed Mobility) bed rails;head of bed elevated  -       Row Name 07/29/24 1528          Transfers    Transfers sit-stand transfer;stand-sit transfer;toilet transfer  -     Comment, (Transfers) Verbal cues for safe hand placement  -       Row Name 07/29/24 1528          Sit-Stand Transfer    Sit-Stand [place] (Transfers) minimum assist (75% patient effort);1 person assist;verbal cues  -     Assistive Device (Sit-Stand Transfers) walker, front-wheeled  -     Comment, (Sit-Stand Transfer) STS x1 from the EOB, x1 from the commode  -       Row Name 07/29/24 1528          Stand-Sit Transfer    Stand-Sit [place] (Transfers) minimum assist (75% patient effort);1 person assist;verbal cues  -     Assistive Device (Stand-Sit Transfers) walker, front-wheeled  -       Row Name 07/29/24 1528          Toilet Transfer    [place] Level (Toilet Transfer) minimum assist (75% patient effort);1 person assist;verbal cues  -      Assistive Device (Toilet Transfer) walker, front-wheeled;commode;grab bars/safety frame  -[facility] Name 07/29/24 1528          Functional Mobility    Functional Mobility- Ind. Level contact guard assist;minimum assist (75% patient effort)  -     Functional Mobility- Device walker, front-wheeled  -     Functional Mobility-Distance (Feet) --  to/from the bathroom  -     Functional Mobility- Comment Pt requiring Frank for RWx management and safety awareness during turns and immediately before standing/sitting.  -[facility] Name 07/29/24 1528          Activities of Daily Living    BADL Assessment/Intervention upper body dressing;grooming;toileting  -KF       Row Name 07/29/24 1528          Upper Body Dressing Assessment/Training    [place] Level (Upper Body Dressing) don;front opening garment;minimum assist (75% patient effort)  -     Position (Upper Body Dressing) edge of bed sitting  -[facility] Name 07/29/24 1528          Grooming Assessment/Training    [place] Level (Grooming) wash face, hands;set up  -     Position (Grooming) supported sitting  -KF       Row Name 07/29/24 1528          Toileting Assessment/Training    [place] Level (Toileting) adjust/manage clothing;perform perineal hygiene;contact guard assist  -     Assistive Devices (Toileting) commode;grab bar/safety frame  -     Position (Toileting) unsupported sitting;supported standing  -               User Key  (r) = Recorded By, (t) = Taken By, (c) = Cosigned By      Initials Name Provider Type     Yaritza Perez OT Occupational Therapist                   Obj/Interventions       [facility] Name 07/29/24 1529          Sensory Assessment (Somatosensory)    Sensory Assessment (Somatosensory) UE sensation intact  -KF       Row Name 07/29/24 1529          Range of Motion Comprehensive    General Range of Motion bilateral upper extremity ROM WFL  -[facility] Name 07/29/24 1529          Strength Comprehensive (MMT)     General Manual Muscle Testing (MMT) Assessment upper extremity strength deficits identified  -KF     Comment, General Manual Muscle Testing (MMT) Assessment BUE grossly 3+/5  -KF       Row Name 07/29/24 1529          Motor Skills    Motor Skills functional endurance  -KF     Functional Endurance Pt's O2 remained >92% on 2L NC during mobility, however pt did fatigue quickly.  -       Row Name 07/29/24 1529          Balance    Balance Assessment sitting static balance;sitting dynamic balance;sit to stand dynamic balance;standing static balance;standing dynamic balance  -KF     Static Sitting Balance standby assist  -KF     Dynamic Sitting Balance contact guard  -KF     Position, Sitting Balance unsupported;sitting edge of bed;other (see comments)  commode  -KF     Sit to Stand Dynamic Balance minimal assist;1-person assist;verbal cues  -KF     Static Standing Balance contact guard  -KF     Dynamic Standing Balance minimal assist;1-person assist  -KF     Position/Device Used, Standing Balance supported;walker, front-wheeled  -KF     Balance Interventions sitting;standing;sit to stand;supported;static;dynamic;occupation based/functional task  -KF               User Key  (r) = Recorded By, (t) = Taken By, (c) = Cosigned By      Initials Name Provider Type    KF Yaritza Perez OT Occupational Therapist                   Goals/Plan       Row Name 07/29/24 1532          Transfer Goal 1 (OT)    Activity/Assistive Device (Transfer Goal 1, OT) commode;bed-to-chair/chair-to-bed  -KF     [place] Level/Cues Needed (Transfer Goal 1, OT) supervision required  -KF     Time Frame (Transfer Goal 1, OT) long term goal (LTG);10 days  -KF     Progress/Outcome (Transfer Goal 1, OT) goal ongoing  -KF       Row Name 07/29/24 1532          Dressing Goal 1 (OT)    Activity/Device (Dressing Goal 1, OT) upper body dressing;lower body dressing  -KF     [place]/Cues Needed (Dressing Goal 1, OT) supervision required  -KF     Time  Frame (Dressing Goal 1, OT) short term goal (STG);5 days  -KF     Progress/Outcome (Dressing Goal 1, OT) goal ongoing  -KF       Row Name 07/29/24 1532          Toileting Goal 1 (OT)    Activity/Device (Toileting Goal 1, OT) adjust/manage clothing;perform perineal hygiene  -KF     [place] Level/Cues Needed (Toileting Goal 1, OT) supervision required  -KF     Time Frame (Toileting Goal 1, OT) long term goal (LTG);10 days  -KF     Progress/Outcome (Toileting Goal 1, OT) goal ongoing  -KF       Row Name 07/29/24 1532          Grooming Goal 1 (OT)    Activity/Device (Grooming Goal 1, OT) hair care;oral care;wash face, hands  -KF     Time Frame (Grooming Goal 1, OT) long term goal (LTG);10 days  -KF     Strategies/Barriers (Grooming Goal 1, OT) sink side  -KF     Progress/Outcome (Grooming Goal 1, OT) goal ongoing  -KF       Row Name 07/29/24 1532          Therapy Assessment/Plan (OT)    Planned Therapy Interventions (OT) activity tolerance training;adaptive equipment training;BADL retraining;functional balance retraining;occupation/activity based interventions;patient/caregiver education/training;ROM/therapeutic exercise;strengthening exercise;transfer/mobility retraining  -KF               User Key  (r) = Recorded By, (t) = Taken By, (c) = Cosigned By      Initials Name Provider Type    Yaritza Cullen, ZORAIDA Occupational Therapist                   Clinical Impression       Row Name 07/29/24 1530          Pain Assessment    Pretreatment Pain Rating 0/10 - no pain  -KF     Posttreatment Pain Rating 0/10 - no pain  -KF     Pain Intervention(s) Repositioned;Ambulation/increased activity  -KF       Row Name 07/29/24 1530          Plan of Care Review    Plan of Care Reviewed With patient  -KF     Progress no change  -KF     Outcome Evaluation OT evaluation completed. The pt presents below his functional baseline with generalized weakness, decreased activity tolerance, balance deficits, and decreased safety awareness.  The pt ambulated to/from the bathroom using a RWx with Frank x1 for safety. The pt's HR increased to 133bpm, recovering in the 120s during session. Pt completed toileting and hygiene with CGA. Pt will benefit from continued IP OT services to increase the pt's safety and independence during ADLs and functional mobility. Recommend a d/c to SNF for best outcome.  -KF       Row Name 07/29/24 0340          Therapy Assessment/Plan (OT)    Rehab Potential (OT) good, to achieve stated therapy goals  -KF     Criteria for Skilled Therapeutic Interventions Met (OT) yes;skilled treatment is necessary  -KF     Therapy Frequency (OT) daily  -KF     Predicted Duration of Therapy Intervention (OT) 7 days  -KF       Row Name 07/29/24 5970          Therapy Plan Review/Discharge Plan (OT)    Anticipated Discharge Disposition (OT) skilled nursing facility  -KF       Row Name 07/29/24 1530          Vital Signs    Pre Systolic BP Rehab 151  -KF     Pre Treatment Diastolic BP 91  -KF     Pretreatment Heart Rate (beats/min) 118  -KF     Intratreatment Heart Rate (beats/min) 133  -KF     Posttreatment Heart Rate (beats/min) 124  -KF     Pre SpO2 (%) 94  2L  -KF     O2 Delivery Pre Treatment nasal cannula  -KF     Intra SpO2 (%) 92  -KF     O2 Delivery Intra Treatment nasal cannula  -KF     Post SpO2 (%) 92  -KF     O2 Delivery Post Treatment nasal cannula  -KF     Pre Patient Position Supine  -KF     Intra Patient Position Standing  -KF     Post Patient Position Sitting  -KF       Row Name 07/29/24 9690          Positioning and Restraints    Pre-Treatment Position in bed  -KF     Post Treatment Position chair  -KF     In Chair notified nsg;reclined;call light within reach;encouraged to call for assist;exit alarm on;waffle cushion;legs elevated  -KF               User Key  (r) = Recorded By, (t) = Taken By, (c) = Cosigned By      Initials Name Provider Type    KF Yaritza Perez, OT Occupational Therapist                   Outcome Measures        Row Name 07/29/24 1533          How much help from another is currently needed...    Putting on and taking off regular lower body clothing? 3  -KF     Bathing (including washing, rinsing, and drying) 3  -KF     Toileting (which includes using toilet bed pan or urinal) 3  -KF     Putting on and taking off regular upper body clothing 3  -KF     Taking care of personal grooming (such as brushing teeth) 3  -KF     Eating meals 3  -KF     AM-PAC 6 Clicks Score (OT) 18  -KF       Row Name 07/29/24 1527 07/29/24 0826       How much help from another person do you currently need...    Turning from your back to your side while in flat bed without using bedrails? 3  -CK (r) HM (t) CK (c) 3  -LB    Moving from lying on back to sitting on the side of a flat bed without bedrails? 3  -CK (r) HM (t) CK (c) 3  -LB    Moving to and from a bed to a chair (including a wheelchair)? 3  -CK (r) HM (t) CK (c) 3  -LB    Standing up from a chair using your arms (e.g., wheelchair, bedside chair)? 3  -CK (r) HM (t) CK (c) 3  -LB    Climbing 3-5 steps with a railing? 2  -CK (r) HM (t) CK (c) 2  -LB    To walk in hospital room? 3  -CK (r) HM (t) CK (c) 3  -LB    AM-PAC 6 Clicks Score (PT) 17  -CK (r) HM (t) 17  -LB    Highest Level of Mobility Goal 5 --> Static standing  -CK (r) HM (t) 5 --> Static standing  -LB      Row Name 07/29/24 1533 07/29/24 1527       Functional Assessment    Outcome Measure Options AM-PAC 6 Clicks Daily Activity (OT)  -KF AM-PAC 6 Clicks Basic Mobility (PT)  -CK (r) HM (t) CK (c)              User Key  (r) = Recorded By, (t) = Taken By, (c) = Cosigned By      Initials Name Provider Type    Amalia Hannah, RN Registered Nurse    CK Rufina Reynoso, PT Physical Therapist    KF Yaritza Perez, OT Occupational Therapist    HM Jackelyn Cisse, PT Student PT Student                    Occupational Therapy Education       Title: PT OT SLP Therapies (In Progress)       Topic: Occupational Therapy (In Progress)        Point: ADL training (In Progress)       Description:   Instruct learner(s) on proper safety adaptation and remediation techniques during self care or transfers.   Instruct in proper use of assistive devices.                  Learning Progress Summary             Patient Acceptance, E,TB, NR by  at 7/29/2024 1339                         Point: Home exercise program (Not Started)       Description:   Instruct learner(s) on appropriate technique for monitoring, assisting and/or progressing therapeutic exercises/activities.                  Learner Progress:  Not documented in this visit.              Point: Precautions (In Progress)       Description:   Instruct learner(s) on prescribed precautions during self-care and functional transfers.                  Learning Progress Summary             Patient Acceptance, E,TB, NR by KF at 7/29/2024 1339                         Point: Body mechanics (In Progress)       Description:   Instruct learner(s) on proper positioning and spine alignment during self-care, functional mobility activities and/or exercises.                  Learning Progress Summary             Patient Acceptance, E,TB, NR by  at 7/29/2024 1339                                         User Key       Initials Effective Dates Name Provider Type Discipline     08/09/23 -  Yaritza Perez OT Occupational Therapist OT                  OT Recommendation and Plan  Planned Therapy Interventions (OT): activity tolerance training, adaptive equipment training, BADL retraining, functional balance retraining, occupation/activity based interventions, patient/caregiver education/training, ROM/therapeutic exercise, strengthening exercise, transfer/mobility retraining  Therapy Frequency (OT): daily  Plan of Care Review  Plan of Care Reviewed With: patient  Progress: no change  Outcome Evaluation: OT evaluation completed. The pt presents below his functional baseline with generalized weakness, decreased activity  tolerance, balance deficits, and decreased safety awareness. The pt ambulated to/from the bathroom using a RWx with Frank x1 for safety. The pt's HR increased to 133bpm, recovering in the 120s during session. Pt completed toileting and hygiene with CGA. Pt will benefit from continued IP OT services to increase the pt's safety and independence during ADLs and functional mobility. Recommend a d/c to SNF for best outcome.     Time Calculation:   Evaluation Complexity (OT)  Review Occupational Profile/Medical/Therapy History Complexity: brief/low complexity  Assessment, Occupational Performance/Identification of Deficit Complexity: 1-3 performance deficits  Clinical Decision Making Complexity (OT): problem focused assessment/low complexity  Overall Complexity of Evaluation (OT): low complexity     Time Calculation- OT       Row Name 07/29/24 1533             Time Calculation- OT    OT Start Time 1339  -KF      OT Received On 07/29/24  -KF      OT Goal Re-Cert Due Date 08/08/24  -KF         Timed Charges    46964 - OT Therapeutic Activity Minutes 2  -KF      87677 - OT Self Care/Mgmt Minutes 12  -KF         Untimed Charges    OT Eval/Re-eval Minutes 35  -KF         Total Minutes    Timed Charges Total Minutes 14  -KF      Untimed Charges Total Minutes 35  -KF       Total Minutes 49  -KF                User Key  (r) = Recorded By, (t) = Taken By, (c) = Cosigned By      Initials Name Provider Type    KF Yaritza Perez OT Occupational Therapist                  Therapy Charges for Today       Code Description Service Date Service Provider Modifiers Qty    [id number]  OT EVAL LOW COMPLEXITY 3 7/29/2024 Yaritza Perez OT GO 1    [id number]  OT SELF CARE/MGMT/TRAIN EA 15 MIN 7/29/2024 Yaritza Perez OT GO 1                 Yaritza Perez OT  7/29/2024

## 2024-07-29 NOTE — PROGRESS NOTES
[facility] Medicine Services  PROGRESS NOTE    Patient Name: Anshu Engel  : 1960  MRN: [id number]    Date of Admission: 2024  Primary Care Physician: Provider, No Known    Subjective   Subjective     CC:  Confusion, hypercapnea, transfer from [facility]    HPI:  Doing well today - per pulm d/w family bedside yesterday appears at baseline  Worse BIPAP all night without issue  Understands importance of that  More conversationally appropriate today - hopeful to go home soon with dogs    Objective   Objective     Vital Signs:   Temp:  [97.7 °F (36.5 °C)-98.7 °F (37.1 °C)] 97.9 °F (36.6 °C)  Heart Rate:  [100-122] 111  Resp:  [17-22] 18  BP: (113-160)/(74-91) 151/91  Flow (L/min):  [2-3] 2     Physical Exam:  Constitutional: Chronically ill appearing patient, very thin male  HENT: NCAT, mucous membranes moist  Respiratory: Significantly diminished breath sounds bilaterally throughout, normal work of breathing on home 2 liters n/c  Cardiovascular: Regular but tachycardia, no murmurs, rubs, or gallops  Gastrointestinal: Soft, nontender, nondistended  Musculoskeletal: Muscle tone significantly diminished throughout  Psychiatric: Anxious affect, easily distractible and hard to focus  Neurologic: Alert and oriented, anxious. Tremors bilateral hands appreciated but improved from yesterday  Skin: No rashes    Results Reviewed:  LAB RESULTS:      Lab 24  0705 24  0709 24  1059   WBC 7.52 8.08 8.99   HEMOGLOBIN 10.1* 10.0* 10.2*   HEMATOCRIT 31.1* 29.3* 30.7*   PLATELETS 280 271 244   NEUTROS ABS  --   --  7.85*   IMMATURE GRANS (ABS)  --   --  0.04   LYMPHS ABS  --   --  0.40*   MONOS ABS  --   --  0.67   EOS ABS  --   --  0.02   MCV 91.7 88.0 90.8   PROCALCITONIN  --   --  0.05         Lab 24  0705 24  1906 24  0709 24  1059   SODIUM 138  --  137 136   POTASSIUM 3.9 4.1 3.4* 3.5   CHLORIDE 96*  --  93* 94*   CO2 39.0*  --  38.0* 39.0*    ANION GAP 3.0*  --  6.0 3.0*   BUN 14  --  9 9   CREATININE 0.56*  --  0.57* 0.49*   EGFR 111.4  --  110.8 116.0   GLUCOSE 132*  --  99 126*   CALCIUM 8.9  --  8.8 8.5*   HEMOGLOBIN A1C  --   --  4.50*  --    TSH  --   --  0.557  --          Lab 07/27/24  1059   TOTAL PROTEIN 5.4*   ALBUMIN 3.7   GLOBULIN 1.7   ALT (SGPT) 9   AST (SGOT) 13   BILIRUBIN 0.3   ALK PHOS 53             Lab 07/28/24  0709   CHOLESTEROL 177   LDL CHOL 109*   HDL CHOL 50   TRIGLYCERIDES 99             Lab 07/28/24  0259 07/27/24  1321 07/27/24  1059   PH, ARTERIAL 7.420 7.296* 7.297*   PCO2, ARTERIAL 59.0* 77.8* 76.1*   PO2 ART 80.6* 94.5 80.0*   FIO2 28 28 28   HCO3 ART 38.3* 37.9* 37.2*   BASE EXCESS ART 11.9* 9.1* 8.6*   CARBOXYHEMOGLOBIN 1.4 1.4 1.4     Brief Urine Lab Results       None            Microbiology Results Abnormal       None            No radiology results from the last 24 hrs        Current medications:  Scheduled Meds:clonazePAM, 0.5 mg, Oral, BID  doxycycline, 100 mg, Oral, Q12H  heparin (porcine), 5,000 Units, Subcutaneous, Q8H  ipratropium-albuterol, 3 mL, Nebulization, 4x Daily - RT  lactated ringers, 500 mL, Intravenous, Once  lisinopril, 10 mg, Oral, Daily  megestrol, 40 mg, Oral, Daily  montelukast, 10 mg, Oral, Nightly  predniSONE, 40 mg, Oral, Daily With Breakfast  sodium chloride, 10 mL, Intravenous, Q12H  vilazodone, 20 mg, Oral, Daily      Continuous Infusions:   PRN Meds:.  acetaminophen **OR** acetaminophen **OR** acetaminophen    albuterol    senna-docusate sodium **AND** polyethylene glycol **AND** bisacodyl **AND** bisacodyl    Calcium Replacement - Follow Nurse / BPA Driven Protocol    lactulose    Magnesium Standard Dose Replacement - Follow Nurse / BPA Driven Protocol    ondansetron ODT **OR** ondansetron    Phosphorus Replacement - Follow Nurse / BPA Driven Protocol    Potassium Replacement - Follow Nurse / BPA Driven Protocol    sodium chloride    sodium chloride    Assessment & Plan   Assessment  & Plan     Active Hospital Problems    Diagnosis  POA    **COPD with acute exacerbation [J44.1]  Yes    HTN (hypertension) [I10]  Unknown    HLD (hyperlipidemia) [E78.5]  Unknown    Lung nodule [R91.1]  Unknown    Acute on chronic respiratory failure with hypercapnia [J96.22]  Unknown    Respiratory acidosis [E87.29]  Unknown    Anemia [D64.9]  Unknown    Acute-on-chronic respiratory failure [J96.20]  Yes      Resolved Hospital Problems   No resolved problems to display.        Brief Hospital Course to date:  Anshu Engel is a 64 y.o. male w COPD on chronic 2 liters n/c, h/o granulomatous lung nodules, anxiety previously on benzos who presented from [facility] as transfer on family request after 4 day admission there for resp hypercapnea + concern for benzo withdrawal  Still confused despite improved resp hypercapnea (now compensated). Do suspect some benzo withdrawal given anxiety/tremors/timeline of last benzo fills.   Overall, feel patient baseline health status is probably quite poor from [facility] documentation and overall condition of patient's lung disease and nutritional status - so hard to tell difference from baseline    Acute encephalopathy 2/2 hypercapnea + benzo withdrawal  Acute on chronic hypercapneic/hypoxic resp failure 2/2 COPD w exacerbation  -Patient's clonazepam was prescribed 0.5 mg BID prescribed 6/7 x #30 days per PDMP. UDS negative on admission to [facility] so very likely some withdrawal. Per fiance, stopped benzo abruptly on 7/21 by patient decision  -in regards to pulm status, BIPAP qhs. will need BIPAP at discharge. CM consult for assistance  -f/u OP w [facility]-pulm closely after discharge  -complete prednisone/doxy    Sinus tachycardia to 120's  -'s since arrival, no better w 1.5 L bolus, maybe worse  -patient w significant anxiety so may contribute  -TSH wnl, not on home BB, no clear other secondary cause  -obtain D-dimer, if elev will obtain CTA chest  -otherwise  consider ECHO if persistent    Severe malnutrition - nutrition consultation pending  Significant anxiety - agitation at OSH requiring geodon, home trintellix for now  Acute constipation - improved  Former smoker - per fiance, quit x 6 months ago  HTN - home lisinopril    PT/OT pending - suspect he will get IPR recs and then need to decide from there on dispo. Medically ready otherwise once BIPAP arrangements in place    Expected Discharge Location and Transportation: will likely have IPR recs  Expected Discharge 8/1 (Discharge date is tentative pending patient's medical condition and is subject to change)  Expected Discharge Date: 8/1/2024; Expected Discharge Time:      VTE Prophylaxis:  Pharmacologic VTE prophylaxis orders are present.         AM-PAC 6 Clicks Score (PT): 17 (07/29/24 3225)    CODE STATUS:   Code Status and Medical Interventions: No CPR (Do Not Attempt to Resuscitate); Limited Support; No intubation (DNI), No vasopressors, No dialysis, No cardioversion, No artificial nutrition   Ordered at: 07/27/24 1210     Medical Intervention Limits:    No intubation (DNI)    No vasopressors    No dialysis    No cardioversion    No artificial nutrition     Level Of Support Discussed With:    Health Care Surrogate     Code Status (Patient has no pulse and is not breathing):    No CPR (Do Not Attempt to Resuscitate)     Medical Interventions (Patient has pulse or is breathing):    Limited Support       Celine Fink MD  07/29/24

## 2024-07-29 NOTE — PLAN OF CARE
Goal Outcome Evaluation:  Plan of Care Reviewed With: patient        Progress: no change  Outcome Evaluation: OT evaluation completed. The pt presents below his functional baseline with generalized weakness, decreased activity tolerance, balance deficits, and decreased safety awareness. The pt ambulated to/from the bathroom using a RWx with Frank x1 for safety. The pt's HR increased to 133bpm, recovering in the 120s during session. Pt completed toileting and hygiene with CGA. Pt will benefit from continued IP OT services to increase the pt's safety and independence during ADLs and functional mobility. Recommend a d/c to SNF for best outcome.      Anticipated Discharge Disposition (OT): skilled nursing facility

## 2024-07-29 NOTE — PLAN OF CARE
Problem: Palliative Care  Goal: Enhanced Quality of Life  Intervention: Promote Advance Care Planning  Flowsheets (Taken 7/29/2024 1619)  Life Transition/Adjustment:   palliative care initiated   palliative care discussed   decision-making facilitated     Problem: Palliative Care  Goal: Enhanced Quality of Life  Intervention: Optimize Psychosocial Wellbeing  Flowsheets (Taken 7/29/2024 1619)  Supportive Measures:   active listening utilized   verbalization of feelings encouraged   decision-making supported   goal-setting facilitated     Problem: COPD (Chronic Obstructive Pulmonary Disease) Comorbidity  Goal: Maintenance of COPD Symptom Control  Intervention: Maintain COPD-Symptom Control  Recent Flowsheet Documentation  Taken 7/29/2024 1625 by Brigida Phillips, RN  Supportive Measures:   active listening utilized   verbalization of feelings encouraged   decision-making supported   goal-setting facilitated   problem-solving facilitated  Taken 7/29/2024 1619 by Brigida Phillips RN  Supportive Measures:   active listening utilized   verbalization of feelings encouraged   decision-making supported   goal-setting facilitated     Problem: Adult Inpatient Plan of Care  Goal: Plan of Care Review  7/29/2024 1625 by Brigida Phillips RN  Outcome: Ongoing, Progressing  Flowsheets (Taken 7/29/2024 1620)  Progress: no change  Plan of Care Reviewed With: patient  Outcome Evaluation: Pt is anxious to get up out of bed and be more active.  He reports seeing a psychiatrist and they are managing Klonipin for anxiety. Pt has been wearing bipap for elevated CO2. He reports he has 2 children, a son and a dtr. His S.O. Lolis and he have been together for 20 years.  It has reported that she has paperwork for POA.  Pt worked in industrial setting. He wants to be home with his dogs and be more active. He is comfortable at present.  CM working on bipap.  No family present. Co visit with  Josh. Pt reports bm 2 days ago. Miralax is  effective for pt.    7/29/2024 1619 by Brigida Phillips, RN  Outcome: Ongoing, Progressing   Goal Outcome Evaluation:

## 2024-07-29 NOTE — THERAPY EVALUATION
Patient Name: Anshu Engel  : 1960    MRN: [id number]                              Today's Date: 2024       Admit Date: 2024    Visit Dx: No diagnosis found.  Patient Active Problem List   Diagnosis    COPD with acute exacerbation    HTN (hypertension)    HLD (hyperlipidemia)    Lung nodule    Acute on chronic respiratory failure with hypercapnia    Respiratory acidosis    Anemia    Acute-on-chronic respiratory failure     Past Medical History:   Diagnosis Date    Allergic     Elevated cholesterol      Past Surgical History:   Procedure Laterality Date    ANKLE SURGERY Left     fracture repair      General Information       Row Name 24 [facility]          Physical Therapy Time and Intention    Document Type evaluation (P)   -     Mode of Treatment physical therapy (P)   -       Row Name 24 142          General Information    Patient Profile Reviewed yes (P)   -HM     Prior Level of Function independent:;all household mobility;community mobility;bed mobility;ADL's (P)   Reported having a w/c and SPC at home but did not report AD use. Pt wears portable oxygen at home on 2L  -     Existing Precautions/Restrictions oxygen therapy device and L/min;fall (P)   -     Barriers to Rehab medically complex;previous functional deficit (P)   -       Row Name 24 142          Living Environment    People in Home significant other (P)   -       Row Name 24 142          Home Main Entrance    Number of Stairs, Main Entrance other (see comments) (P)   ramp to enter  -     Stair Railings, Main Entrance none (P)   -       Row Name 24 142          Stairs Within Home, Primary    Number of Stairs, Within Home, Primary none (P)   -       Row Name 24 142          Cognition    Orientation Status (Cognition) oriented x 3 (P)   -       Row Name 24 142          Safety Issues, Functional Mobility    Safety Issues Affecting Function (Mobility) awareness of need  for assistance;insight into deficits/self-awareness;safety precaution awareness;safety precautions follow-through/compliance;problem-solving;sequencing abilities (P)   -     Impairments Affecting Function (Mobility) balance;endurance/activity tolerance;cognition (P)   -     Comment, Safety Issues/Impairments (Mobility) Aware and following commands with increased time for processing at times (P)   -               User Key  (r) = Recorded By, (t) = Taken By, (c) = Cosigned By      Initials Name Provider Type     Jackelyn Cisse, PT Student PT Student                   Mobility       Row Name 07/29/24 1424          Bed Mobility    Bed Mobility supine-sit;scooting/bridging (P)   -HM     Scooting/Bridging [place] (Bed Mobility) contact guard (P)   -HM     Supine-Sit [place] (Bed Mobility) contact guard (P)   -     Assistive Device (Bed Mobility) head of bed elevated (P)   -HM     Comment, (Bed Mobility) Pt did not require physical assist but required increased time to complete task (P)   -       Row Name 07/29/24 1424          Transfers    Comment, (Transfers) STS x 2 (1 x from EOB, 1 x from commode). VC for hand placement and sequening throughout (P)   -       Row Name 07/29/24 1424          Bed-Chair Transfer    Bed-Chair [place] (Transfers) not tested (P)   -       Row Name 07/29/24 1424          Sit-Stand Transfer    Sit-Stand [place] (Transfers) minimum assist (75% patient effort);verbal cues (P)   -     Assistive Device (Sit-Stand Transfers) walker, front-wheeled (P)   -HM     Comment, (Sit-Stand Transfer) VC for hand placement and sequencing with Min A to for safety with standing. (P)   -       Row Name 07/29/24 1424          Gait/Stairs (Locomotion)    [place] Level (Gait) minimum assist (75% patient effort);verbal cues (P)   -HM     Assistive Device (Gait) walker, front-wheeled (P)   -HM     Distance in Feet (Gait) 20 (P)   -     Deviations/Abnormal Patterns  (Gait) bilateral deviations;base of support, narrow;gait speed decreased;antalgic;stride length decreased (P)   -     Bilateral Gait Deviations forward flexed posture;heel strike decreased (P)   -     [place] Level (Stairs) not tested (P)   -     Comment, (Gait/Stairs) Pt ambulated with a step through gait pattern with mild postural sway throughout with no overt LOB. Min A with AD management throughout. Pt amb with 2L O2 with sats reading 93% with ambulation.  Further ambulation limited by poor activity tolerance. (P)   -               User Key  (r) = Recorded By, (t) = Taken By, (c) = Cosigned By      Initials Name Provider Type     Jackelyn Cisse, PT Student PT Student                   Obj/Interventions       Row Name 07/29/24 1514          Range of Motion Comprehensive    General Range of Motion bilateral lower extremity ROM WFL (P)   -       Row Name 07/29/24 1514          Strength Comprehensive (MMT)    General Manual Muscle Testing (MMT) Assessment lower extremity strength deficits identified (P)   -     Comment, General Manual Muscle Testing (MMT) Assessment Formal MMT not assessed at this time, however, pt with at least 3/5 observed with functional mobility in [facility]. (P)   -       Row Name 07/29/24 1514          Balance    Balance Assessment sitting static balance;sitting dynamic balance;sit to stand dynamic balance;standing static balance;standing dynamic balance (P)   -     Static Sitting Balance contact guard (P)   -     Dynamic Sitting Balance contact guard (P)   -HM     Position, Sitting Balance unsupported;sitting edge of bed;other (see comments) (P)   commode  -HM     Sit to Stand Dynamic Balance minimal assist;verbal cues (P)   -HM     Static Standing Balance minimal assist;verbal cues (P)   -HM     Dynamic Standing Balance minimal assist;verbal cues (P)   -     Position/Device Used, Standing Balance supported;walker, front-wheeled (P)   -HM     Balance Interventions  sitting;standing;sit to stand;occupation based/functional task (P)   -HM     Comment, Balance Pt unsteady with FWW with no overt LOB (P)   -HM       Row Name 07/29/24 1514          Sensory Assessment (Somatosensory)    Sensory Assessment (Somatosensory) LE sensation intact (P)   -HM               User Key  (r) = Recorded By, (t) = Taken By, (c) = Cosigned By      Initials Name Provider Type     Jackelyn Cisse, PT Student PT Student                   Goals/Plan       Row Name 07/29/24 1523          Bed Mobility Goal 1 (PT)    Activity/Assistive Device (Bed Mobility Goal 1, PT) sit to supine/supine to sit (P)   -HM     Mobile Level/Cues Needed (Bed Mobility Goal 1, PT) independent (P)   -HM     Time Frame (Bed Mobility Goal 1, PT) short term goal (STG);3 days (P)   -HM     Progress/Outcomes (Bed Mobility Goal 1, PT) new goal (P)   -       Row Name 07/29/24 1525          Transfer Goal 1 (PT)    Activity/Assistive Device (Transfer Goal 1, PT) sit-to-stand/stand-to-sit;bed-to-chair/chair-to-bed (P)   -HM     Mobile Level/Cues Needed (Transfer Goal 1, PT) standby assist (P)   -HM     Time Frame (Transfer Goal 1, PT) long term goal (LTG);10 days (P)   -HM     Progress/Outcome (Transfer Goal 1, PT) new goal (P)   -       Row Name 07/29/24 1525          Gait Training Goal 1 (PT)    Activity/Assistive Device (Gait Training Goal 1, PT) gait (walking locomotion);assistive device use (P)   -HM     Mobile Level (Gait Training Goal 1, PT) standby assist (P)   -HM     Distance (Gait Training Goal 1, PT) 150' (P)   -HM     Time Frame (Gait Training Goal 1, PT) long term goal (LTG);10 days (P)   -HM     Progress/Outcome (Gait Training Goal 1, PT) new goal (P)   -       Row Name 07/29/24 1523          Therapy Assessment/Plan (PT)    Planned Therapy Interventions (PT) balance training;bed mobility training;gait training;home exercise program;joint mobilization;lumbar stabilization;manual therapy  techniques;motor coordination training;stretching;strengthening;stair training;ROM (range of motion);postural re-education;patient/family education;neuromuscular re-education;transfer training (P)   -               User Key  (r) = Recorded By, (t) = Taken By, (c) = Cosigned By      Initials Name Provider Type     Jackelyn Cisse, PT Student PT Student                   Clinical Impression       Row Name 07/29/24 1519          Pain    Pretreatment Pain Rating 0/10 - no pain (P)   -HM     Posttreatment Pain Rating 0/10 - no pain (P)   -     Pain Intervention(s) Repositioned;Elevated;Ambulation/increased activity (P)   -       Row Name 07/29/24 1519          Plan of Care Review    Plan of Care Reviewed With patient (P)   -     Outcome Evaluation Pt amb 20' this session with Min A  on 2L of O2 with stats reading at 93%. Pt with elevated HR throughout treatment reaching 133 during ambulation and in the 120s with sitting. Pt presents below baseline with deficits in generalized strength, balance, and poor activity tolerance. Further IPPT warrated during admission. PT recommended DC to SNF for best outcomes. (P)   -       Row Name 07/29/24 1519          Therapy Assessment/Plan (PT)    Patient/Family Therapy Goals Statement (PT) To go home (P)   -     Rehab Potential (PT) good, to achieve stated therapy goals (P)   -     Criteria for Skilled Interventions Met (PT) yes;meets criteria;skilled treatment is necessary (P)   -     Therapy Frequency (PT) daily (P)   -     Predicted Duration of Therapy Intervention (PT) 3 days (P)   -       Row Name 07/29/24 1519          Vital Signs    Pre Systolic BP Rehab 151 (P)   -HM     Pre Treatment Diastolic BP 91 (P)   -HM     Pretreatment Heart Rate (beats/min) 119 (P)   -HM     Intratreatment Heart Rate (beats/min) 133 (P)   -HM     Posttreatment Heart Rate (beats/min) 124 (P)   -HM     Pre SpO2 (%) 94 (P)   -     O2 Delivery Pre Treatment nasal cannula (P)   -      Intra SpO2 (%) 93 (P)   -HM     O2 Delivery Intra Treatment nasal cannula (P)   -HM     Post SpO2 (%) 92 (P)   -HM     O2 Delivery Post Treatment nasal cannula (P)   -HM     Pre Patient Position Supine (P)   -HM     Intra Patient Position Standing (P)   -HM     Post Patient Position Sitting (P)   -HM       Row Name 07/29/24 1519          Positioning and Restraints    Pre-Treatment Position in bed (P)   -HM     Post Treatment Position chair (P)   -HM     In Chair notified nsg;reclined;call light within reach;encouraged to call for assist;exit alarm on;compression device;waffle cushion;legs elevated (P)   -HM               User Key  (r) = Recorded By, (t) = Taken By, (c) = Cosigned By      Initials Name Provider Type    Jackelyn Trejo, PT Student PT Student                   Outcome Measures       Row Name 07/29/24 1527 07/29/24 0826       How much help from another person do you currently need...    Turning from your back to your side while in flat bed without using bedrails? 3 (P)   -HM 3  -LB    Moving from lying on back to sitting on the side of a flat bed without bedrails? 3 (P)   -HM 3  -LB    Moving to and from a bed to a chair (including a wheelchair)? 3 (P)   -HM 3  -LB    Standing up from a chair using your arms (e.g., wheelchair, bedside chair)? 3 (P)   -HM 3  -LB    Climbing 3-5 steps with a railing? 2 (P)   -HM 2  -LB    To walk in hospital room? 3 (P)   -HM 3  -LB    AM-PAC 6 Clicks Score (PT) 17 (P)   -HM 17  -LB    Highest Level of Mobility Goal 5 --> Static standing (P)   - 5 --> Static standing  -LB      Row Name 07/29/24 1527          Functional Assessment    Outcome Measure Options AM-PAC 6 Clicks Basic Mobility (PT) (P)   -HM               User Key  (r) = Recorded By, (t) = Taken By, (c) = Cosigned By      Initials Name Provider Type    Amalia Hannah, RN Registered Nurse    Jackelyn Trejo, PT Student PT Student                                 Physical Therapy Education        Title: PT OT SLP Therapies (In Progress)       Topic: Physical Therapy (In Progress)       Point: Mobility training (Done)       Learning Progress Summary             Patient Acceptance, E, VU by  at 7/29/2024 1527    Comment: Pt educated on the importance of mobility during admission                         Point: Home exercise program (Not Started)       Learner Progress:  Not documented in this visit.              Point: Body mechanics (Done)       Learning Progress Summary             Patient Acceptance, E, VU by  at 7/29/2024 1527    Comment: Pt educated on the importance of mobility during admission                         Point: Precautions (Done)       Learning Progress Summary             Patient Acceptance, E, VU by  at 7/29/2024 1527    Comment: Pt educated on the importance of mobility during admission                                         User Key       Initials Effective Dates Name Provider Type Discipline     05/07/24 -  Jackelyn Cisse, PT Student PT Student PT                  PT Recommendation and Plan  Planned Therapy Interventions (PT): (P) balance training, bed mobility training, gait training, home exercise program, joint mobilization, lumbar stabilization, manual therapy techniques, motor coordination training, stretching, strengthening, stair training, ROM (range of motion), postural re-education, patient/family education, neuromuscular re-education, transfer training  Plan of Care Reviewed With: (P) patient  Outcome Evaluation: (P) Pt amb 20' this session with Min A  on 2L of O2 with stats reading at 93%. Pt with elevated HR throughout treatment reaching 133 during ambulation and in the 120s with sitting. Pt presents below baseline with deficits in generalized strength, balance, and poor activity tolerance. Further IPPT warrated during admission. PT recommended DC to SNF for best outcomes.     Time Calculation:   PT Evaluation Complexity  History, PT Evaluation Complexity: (P)  1-2 personal factors and/or comorbidities  Examination of Body Systems (PT Eval Complexity): (P) total of 3 or more elements  Clinical Presentation (PT Evaluation Complexity): (P) stable  Clinical Decision Making (PT Evaluation Complexity): (P) low complexity  Overall Complexity (PT Evaluation Complexity): (P) low complexity     PT Charges       Row Name 07/29/24 1340             Time Calculation    Start Time 1340 (P)   -HM      PT Received On 07/29/24 (P)   -HM      PT Goal Re-Cert Due Date 08/08/24 (P)   -HM         Timed Charges    21244 - PT Therapeutic Activity Minutes 8 (P)   -HM         Untimed Charges    PT Eval/Re-eval Minutes 52 (P)   -HM         Total Minutes    Timed Charges Total Minutes 8 (P)   -HM      Untimed Charges Total Minutes 52 (P)   -HM       Total Minutes 60 (P)   -HM                User Key  (r) = Recorded By, (t) = Taken By, (c) = Cosigned By      Initials Name Provider Type     Jackelyn Cisse, PT Student PT Student                  Therapy Charges for Today       Code Description Service Date Service Provider Modifiers Qty    37128604129 HC PT EVAL LOW COMPLEXITY 4 7/29/2024 Jackelyn Cisse, PT Student GP 1    [id number]  PT THERAPEUTIC ACT EA 15 MIN 7/29/2024 Jackelyn Cisse, PT Student GP 1            PT G-Codes  Outcome Measure Options: (P) AM-PAC 6 Clicks Basic Mobility (PT)  AM-PAC 6 Clicks Score (PT): (P) 17  PT Discharge Summary  Anticipated Discharge Disposition (PT): (P) skilled nursing facility    Jackelyn Cisse PT Student  7/29/2024

## 2024-07-29 NOTE — CASE MANAGEMENT/SOCIAL WORK
Discharge Planning Assessment  [facility]     Patient Name: Anshu Engel  MRN: [id number]  Today's Date: 7/29/2024    Admit Date: 7/27/2024    Plan: Home with Home Health vs IPR   Discharge Needs Assessment       Row Name 07/29/24 1227       Living Environment    People in Home significant other    Current Living Arrangements home    Potentially Unsafe Housing Conditions none    In the past 12 months has the electric, gas, oil, or water company threatened to shut off services in your home? No    Primary Care Provided by self;spouse/significant other    Provides Primary Care For no one, unable/limited ability to care for self    Family Caregiver if Needed significant other    Family Caregiver Names Lolis Silvano- NOÉ    Quality of Family Relationships helpful;involved;supportive    Able to Return to Prior Arrangements yes       Transition Planning    Patient/Family Anticipates Transition to home with help/services    Patient/Family Anticipated Services at Transition     Transportation Anticipated family or friend will provide       Discharge Needs Assessment    Readmission Within the Last 30 Days no previous admission in last 30 days    Equipment Currently Used at Home oxygen;wheelchair;cane, straight    Concerns to be Addressed discharge planning    Equipment Needed After Discharge bipap    Outpatient/Agency/Support Group Needs homecare agency    Discharge Facility/Level of Care Needs home with home health    Current Discharge Risk chronically ill;physical impairment                   Discharge Plan       Row Name 07/29/24 1232       Plan    Plan Home with Home Health vs IPR    Patient/Family in Agreement with Plan yes    Plan Comments I have met with Mr. Arguello at bedside today to initiate a discharge plan.  He is withdrawn and possibly confused.  He is agreeable for CM to contact his emergency contact.  I have spoken to Lolis Schaefer, his significant other by phone.  She states that he  lives in a home they share in [place] [facility].  She states that prior to this hopital admission he was able to walk independently using a st cane at times.  He uses a wheelchair when traveling outside the home.  She assists him with activities of daily living.  He uses an [employer] home oxygen concentrator that he paid for not using insurance or [employer] company.  She denies current receipt of home health/outpatient services, but states that they have used [facility] in the past.  I have confirmed that his insurance is [employer] and his PCP is Erika Madden MD.  Mr. Engel has required NIPPV at night during this hospital stay.  I have contacted Rubens with [facility] to initiate NIV/Bipap setup for home use. CM will cont to follow the plan of care, await PT/OT consults and assist with discharge needs as recommendations become available.    Final Discharge Disposition Code 06 - home with home health care                  Continued Care and Services - Admitted Since 7/27/2024       Durable Medical Equipment       Service Provider Request Status Selected Services Address Phone Fax Patient Preferred    [facility] [address] [id number] [phone] --                  Expected Discharge Date and Time       Expected Discharge Date Expected Discharge Time    Aug 1, 2024            Demographic Summary       Row Name 07/29/24 1226       General Information    Admission Type inpatient    Arrived From home    Referral Source admission list    Reason for Consult discharge planning    Preferred Language English       Contact Information    Permission Granted to Share Info With                    Functional Status       Row Name 07/29/24 1227       Functional Status, IADL    Medications assistive person    Meal Preparation assistive person    Housekeeping assistive person    Laundry assistive person    Shopping assistive person                    Psychosocial    No documentation.                  Abuse/Neglect    No documentation.                  Legal    No documentation.                  Substance Abuse    No documentation.                  Patient Forms    No documentation.                     Светлана Jimenez RN

## 2024-07-29 NOTE — NURSING NOTE
WOC consulted for pressure injury to right hip and left arm skin tears.    Patient has a healed wound to his right ischium/trochanter area.  No signs of infection. dressing reinforced.        For skin tears-follow [facility] standing orders for management of skin tears.  Avoid adhesive dressing directly to patient's skin.  Use adhesive remover when removing adhesives.  See orders.    Pressure injury prevention protocol.  Keep patient turned and offloaded.    WOC will sign off.    Coleman Ibarra RN, BSN, CCRN, CWOCN  Wound, Ostomy and Continence (WOC) Department  [facility]

## 2024-07-30 LAB
ANION GAP SERPL CALCULATED.3IONS-SCNC: 6 MMOL/L (ref 5–15)
BUN SERPL-MCNC: 9 MG/DL (ref 8–23)
BUN/CREAT SERPL: 18.4 (ref 7–25)
CALCIUM SPEC-SCNC: 8.6 MG/DL (ref 8.6–10.5)
CHLORIDE SERPL-SCNC: 91 MMOL/L (ref 98–107)
CO2 SERPL-SCNC: 42 MMOL/L (ref 22–29)
CREAT SERPL-MCNC: 0.49 MG/DL (ref 0.76–1.27)
DEPRECATED RDW RBC AUTO: 43.1 FL (ref 37–54)
EGFRCR SERPLBLD CKD-EPI 2021: 116 ML/MIN/1.73
ERYTHROCYTE [DISTWIDTH] IN BLOOD BY AUTOMATED COUNT: 13 % (ref 12.3–15.4)
GLUCOSE SERPL-MCNC: 92 MG/DL (ref 65–99)
HCT VFR BLD AUTO: 30.9 % (ref 37.5–51)
HGB BLD-MCNC: 10.1 G/DL (ref 13–17.7)
MCH RBC QN AUTO: 30 PG (ref 26.6–33)
MCHC RBC AUTO-ENTMCNC: 32.7 G/DL (ref 31.5–35.7)
MCV RBC AUTO: 91.7 FL (ref 79–97)
PLATELET # BLD AUTO: 292 10*3/MM3 (ref 140–450)
PMV BLD AUTO: 9.9 FL (ref 6–12)
POTASSIUM SERPL-SCNC: 3.7 MMOL/L (ref 3.5–5.2)
RBC # BLD AUTO: 3.37 10*6/MM3 (ref 4.14–5.8)
SODIUM SERPL-SCNC: 139 MMOL/L (ref 136–145)
WBC NRBC COR # BLD AUTO: 9.57 10*3/MM3 (ref 3.4–10.8)

## 2024-07-30 PROCEDURE — 99232 SBSQ HOSP IP/OBS MODERATE 35: CPT | Performed by: INTERNAL MEDICINE

## 2024-07-30 PROCEDURE — 94761 N-INVAS EAR/PLS OXIMETRY MLT: CPT

## 2024-07-30 PROCEDURE — 97530 THERAPEUTIC ACTIVITIES: CPT

## 2024-07-30 PROCEDURE — 94799 UNLISTED PULMONARY SVC/PX: CPT

## 2024-07-30 PROCEDURE — 85027 COMPLETE CBC AUTOMATED: CPT | Performed by: INTERNAL MEDICINE

## 2024-07-30 PROCEDURE — 63710000001 PREDNISONE PER 1 MG: Performed by: INTERNAL MEDICINE

## 2024-07-30 PROCEDURE — 80048 BASIC METABOLIC PNL TOTAL CA: CPT | Performed by: INTERNAL MEDICINE

## 2024-07-30 PROCEDURE — 97110 THERAPEUTIC EXERCISES: CPT

## 2024-07-30 PROCEDURE — 94660 CPAP INITIATION&MGMT: CPT

## 2024-07-30 PROCEDURE — 25010000002 HEPARIN (PORCINE) PER 1000 UNITS: Performed by: INTERNAL MEDICINE

## 2024-07-30 RX ADMIN — SENNOSIDES AND DOCUSATE SODIUM 2 TABLET: 50; 8.6 TABLET ORAL at 08:36

## 2024-07-30 RX ADMIN — HEPARIN SODIUM 5000 UNITS: 5000 INJECTION INTRAVENOUS; SUBCUTANEOUS at 06:04

## 2024-07-30 RX ADMIN — MONTELUKAST 10 MG: 10 TABLET, FILM COATED ORAL at 20:23

## 2024-07-30 RX ADMIN — IPRATROPIUM BROMIDE AND ALBUTEROL SULFATE 3 ML: 2.5; .5 SOLUTION RESPIRATORY (INHALATION) at 12:31

## 2024-07-30 RX ADMIN — DOXYCYCLINE 100 MG: 100 CAPSULE ORAL at 08:36

## 2024-07-30 RX ADMIN — DOXYCYCLINE 100 MG: 100 CAPSULE ORAL at 20:23

## 2024-07-30 RX ADMIN — LISINOPRIL 10 MG: 10 TABLET ORAL at 08:36

## 2024-07-30 RX ADMIN — IPRATROPIUM BROMIDE AND ALBUTEROL SULFATE 3 ML: 2.5; .5 SOLUTION RESPIRATORY (INHALATION) at 09:19

## 2024-07-30 RX ADMIN — CLONAZEPAM 0.5 MG: 0.5 TABLET ORAL at 20:23

## 2024-07-30 RX ADMIN — VILAZODONE HYDROCHLORIDE 20 MG: 40 TABLET, FILM COATED ORAL at 08:37

## 2024-07-30 RX ADMIN — PREDNISONE 40 MG: 20 TABLET ORAL at 08:36

## 2024-07-30 RX ADMIN — HEPARIN SODIUM 5000 UNITS: 5000 INJECTION INTRAVENOUS; SUBCUTANEOUS at 20:23

## 2024-07-30 RX ADMIN — IPRATROPIUM BROMIDE AND ALBUTEROL SULFATE 3 ML: 2.5; .5 SOLUTION RESPIRATORY (INHALATION) at 19:58

## 2024-07-30 RX ADMIN — Medication 10 ML: at 09:27

## 2024-07-30 RX ADMIN — HEPARIN SODIUM 5000 UNITS: 5000 INJECTION INTRAVENOUS; SUBCUTANEOUS at 13:35

## 2024-07-30 RX ADMIN — MEGESTROL ACETATE 40 MG: 20 TABLET ORAL at 08:37

## 2024-07-30 RX ADMIN — CLONAZEPAM 0.5 MG: 0.5 TABLET ORAL at 08:36

## 2024-07-30 RX ADMIN — IPRATROPIUM BROMIDE AND ALBUTEROL SULFATE 3 ML: 2.5; .5 SOLUTION RESPIRATORY (INHALATION) at 16:00

## 2024-07-30 NOTE — PROGRESS NOTES
Palliative Care Daily Progress Note     C/C: Patient reports anxiety and shortness of breath.     S: Medical record reviewed. Follow up visit for GOC and symptom management. Events noted. Patient with episodes of anxiety with shortness of breath. Patient with increased HR.     ROS: +anxiety, with shortness of breath. +shortness of breath, currently on 2LNC/BiPAP. +debility. Denies pain, nausea.     O: Code Status:   Code Status and Medical Interventions: No CPR (Do Not Attempt to Resuscitate); Limited Support; No intubation (DNI), No vasopressors, No dialysis, No cardioversion, No artificial nutrition   Ordered at: 07/27/24 1210     Medical Intervention Limits:    No intubation (DNI)    No vasopressors    No dialysis    No cardioversion    No artificial nutrition     Level Of Support Discussed With:    Health Care Surrogate     Code Status (Patient has no pulse and is not breathing):    No CPR (Do Not Attempt to Resuscitate)     Medical Interventions (Patient has pulse or is breathing):    Limited Support      Advanced Directives: On chart  Goals of Care: Ongoing.   Palliative Performance Scale Score: 40%     /77 (BP Location: Left arm, Patient Position: Lying)   Pulse 116   Temp 98.1 °F (36.7 °C) (Oral)   Resp 18   SpO2 94%     Intake/Output Summary (Last 24 hours) at 7/30/2024 1325  Last data filed at 7/30/2024 0900  Gross per 24 hour   Intake 120 ml   Output 1500 ml   Net -1380 ml       PE:  General Appearance:    Patient laying in bed, awakens easily, alert, cachetic, frail, cooperative, NAD   HEENT:    NC/AT, EOMI, anicteric, MMM, face relaxed   Neck:   supple, trachea midline, no JVD   Lungs:     CTA bilaterally, diminished in bases; increased WOB with conversation and exertion; RR  20-22 on exam, 2LNC    Heart:    RRR, normal S1 and S2, no M/R/G,     Abdomen:     Normal bowel sounds, soft, nontender, nondistended   G/U:   Deferred   MSK/Extremities:   Wasting, no edema   Pulses:   Pulses  palpable and equal bilaterally   Skin:   Warm, dry   Neurologic:   A/Ox3, cooperative, ANNA   Psych:   Calm, appropriate     Meds: Reviewed and changes noted    Labs:   Results from last 7 days   Lab Units 07/30/24  0438   WBC 10*3/mm3 9.57   HEMOGLOBIN g/dL 10.1*   HEMATOCRIT % 30.9*   PLATELETS 10*3/mm3 292     Results from last 7 days   Lab Units 07/30/24  0438   SODIUM mmol/L 139   POTASSIUM mmol/L 3.7   CHLORIDE mmol/L 91*   CO2 mmol/L 42.0*   BUN mg/dL 9   CREATININE mg/dL 0.49*   GLUCOSE mg/dL 92   CALCIUM mg/dL 8.6     Results from last 7 days   Lab Units 07/30/24  0438 07/28/24  0709 07/27/24  1059   SODIUM mmol/L 139   < > 136   POTASSIUM mmol/L 3.7   < > 3.5   CHLORIDE mmol/L 91*   < > 94*   CO2 mmol/L 42.0*   < > 39.0*   BUN mg/dL 9   < > 9   CREATININE mg/dL 0.49*   < > 0.49*   CALCIUM mg/dL 8.6   < > 8.5*   BILIRUBIN mg/dL  --   --  0.3   ALK PHOS U/L  --   --  53   ALT (SGPT) U/L  --   --  9   AST (SGOT) U/L  --   --  13   GLUCOSE mg/dL 92   < > 126*    < > = values in this interval not displayed.     Imaging Results (Last 72 Hours)       ** No results found for the last 72 hours. **              Lab 07/28/24  0709   HEMOGLOBIN A1C 4.50*         Diagnostics: Reviewed    A:   COPD with acute exacerbation    HTN (hypertension)    HLD (hyperlipidemia)    Lung nodule    Acute on chronic respiratory failure with hypercapnia    Respiratory acidosis    Anemia    Acute-on-chronic respiratory failure     64 y.o. male with COPD, acute on chronic respiratory failure.   S/Sx:  1. Dyspnea -currently on 2LNC/BIPAP at night  -nebs, antibiotics    2. Anxiety -continue Clonazepam 0.5mg PO BID    3. Debility -PT/OT following    4. GOC -DNR/DNI -per review of chart  -long discussion regarding patient's chronic progressive condition and options for ongoing full treatment versus comfort focused plan of care with hospice services in the future  -[facility] serves patient's home area    P: Follow up visit for GOC.  Patient with very high symptom burden. Reviewed patient's understanding of his condition, he does understand that it is a progressive disease leading to end of life. Reviewed options for ongoing full treatment versus comfort focused plan of care with symptom management with hospice services in the future. Reviewed palliative versus hospice providers in his area. PT/OT recommending SNF, patient is unsure about SNF, considering home. Will continue to address GOC.   Palliative Care Team will continue to follow patient. Please do not hesitate to contact us regarding further sx mgmt or GOC needs.  Kiana Crawley, APRN  7/30/2024  Time spent: 15 minutes

## 2024-07-30 NOTE — PLAN OF CARE
Goal Outcome Evaluation:           Progress: improving       VSS, on 2 NC while awake bipap while sleeping. Slept well. Voiding well. No c/o pain. Confusion is intermittent.

## 2024-07-30 NOTE — PROGRESS NOTES
"    [facility] Medicine Services  PROGRESS NOTE    Patient Name: Anshu Engel  : 1960  MRN: [id number]    Date of Admission: 2024  Primary Care Physician: Provider, No Known    Subjective   Subjective     CC:  Confusion, hypercapnea, transfer from [place]    HPI:  Wore bipap last night but reportedly at one point he pulled it off, was extremely anxious, sats fell to 66 and pt \"looked blue\".  After the episode, he has been able to tolerate 2L nc oxygen.  Currently he is on phone with his fiance.        Objective   Objective     Vital Signs:   Temp:  [97.8 °F (36.6 °C)-98.4 °F (36.9 °C)] 98.1 °F (36.7 °C)  Heart Rate:  [] 116  Resp:  [18-19] 18  BP: (120-139)/(66-95) 121/77  Flow (L/min):  [2] 2     Physical Exam:  Constitutional: Chronically ill appearing , thin, talking on phone   HENT: NCAT,   Respiratory:  not labored; on 2L, talking on phone, mildly tachypnic   Cardiovascular: tachycardic 110   Gastrointestinal: Soft, nontender, nondistended  Psychiatric: Anxious   Neurologic: fine tremors   Skin: No rashes    Results Reviewed:  LAB RESULTS:      Lab 24  0438 24  1855 24  0705 24  0709 24  1059   WBC 9.57  --  7.52 8.08 8.99   HEMOGLOBIN 10.1*  --  10.1* 10.0* 10.2*   HEMATOCRIT 30.9*  --  31.1* 29.3* 30.7*   PLATELETS 292  --  280 271 244   NEUTROS ABS  --   --   --   --  7.85*   IMMATURE GRANS (ABS)  --   --   --   --  0.04   LYMPHS ABS  --   --   --   --  0.40*   MONOS ABS  --   --   --   --  0.67   EOS ABS  --   --   --   --  0.02   MCV 91.7  --  91.7 88.0 90.8   PROCALCITONIN  --   --   --   --  0.05   D DIMER QUANT  --  0.31  --   --   --          Lab 24  0438 24  0705 24  1906 24  0709 24  1059   SODIUM 139 138  --  137 136   POTASSIUM 3.7 3.9 4.1 3.4* 3.5   CHLORIDE 91* 96*  --  93* 94*   CO2 42.0* 39.0*  --  38.0* 39.0*   ANION GAP 6.0 3.0*  --  6.0 3.0*   BUN 9 14  --  9 9   CREATININE 0.49* " 0.56*  --  0.57* 0.49*   EGFR 116.0 111.4  --  110.8 116.0   GLUCOSE 92 132*  --  99 126*   CALCIUM 8.6 8.9  --  8.8 8.5*   HEMOGLOBIN A1C  --   --   --  4.50*  --    TSH  --   --   --  0.557  --          Lab 07/27/24  1059   TOTAL PROTEIN 5.4*   ALBUMIN 3.7   GLOBULIN 1.7   ALT (SGPT) 9   AST (SGOT) 13   BILIRUBIN 0.3   ALK PHOS 53             Lab 07/28/24  0709   CHOLESTEROL 177   LDL CHOL 109*   HDL CHOL 50   TRIGLYCERIDES 99             Lab 07/28/24  0259 07/27/24  1321 07/27/24  1059   PH, ARTERIAL 7.420 7.296* 7.297*   PCO2, ARTERIAL 59.0* 77.8* 76.1*   PO2 ART 80.6* 94.5 80.0*   FIO2 28 28 28   HCO3 ART 38.3* 37.9* 37.2*   BASE EXCESS ART 11.9* 9.1* 8.6*   CARBOXYHEMOGLOBIN 1.4 1.4 1.4     Brief Urine Lab Results       None            Microbiology Results Abnormal       None            No radiology results from the last 24 hrs        Current medications:  Scheduled Meds:clonazePAM, 0.5 mg, Oral, BID  doxycycline, 100 mg, Oral, Q12H  heparin (porcine), 5,000 Units, Subcutaneous, Q8H  ipratropium-albuterol, 3 mL, Nebulization, 4x Daily - RT  lisinopril, 10 mg, Oral, Daily  megestrol, 40 mg, Oral, Daily  montelukast, 10 mg, Oral, Nightly  predniSONE, 40 mg, Oral, Daily With Breakfast  sodium chloride, 10 mL, Intravenous, Q12H  vilazodone, 20 mg, Oral, Daily      Continuous Infusions:   PRN Meds:.  acetaminophen **OR** acetaminophen **OR** acetaminophen    albuterol    senna-docusate sodium **AND** polyethylene glycol **AND** bisacodyl **AND** bisacodyl    Calcium Replacement - Follow Nurse / BPA Driven Protocol    lactulose    Magnesium Standard Dose Replacement - Follow Nurse / BPA Driven Protocol    ondansetron ODT **OR** ondansetron    Phosphorus Replacement - Follow Nurse / BPA Driven Protocol    Potassium Replacement - Follow Nurse / BPA Driven Protocol    sodium chloride    sodium chloride    Assessment & Plan   Assessment & Plan     Active Hospital Problems    Diagnosis  POA    **COPD with acute  exacerbation [J44.1]  Yes    HTN (hypertension) [I10]  Unknown    HLD (hyperlipidemia) [E78.5]  Unknown    Lung nodule [R91.1]  Unknown    Acute on chronic respiratory failure with hypercapnia [J96.22]  Unknown    Respiratory acidosis [E87.29]  Unknown    Anemia [D64.9]  Unknown    Acute-on-chronic respiratory failure [J96.20]  Yes      Resolved Hospital Problems   No resolved problems to display.        Brief Hospital Course to date:  Anshu Engel is a 64 y.o. male w COPD on chronic 2 liters n/c, h/o granulomatous lung nodules, anxiety previously on benzos.  Was admitted to [facility] for hypercapnea/dyspnea.  After 4 days, transferred to [facility]     COPD exacerbation  acute on chronic hypercapneic/hypoxic resp failure   Suspected benzo withdrawal contributing to anxiety   - Patient's clonazepam was prescribed 0.5 mg BID prescribed 6/7 x #30 days per PDMP. UDS negative on admission to [place] so very likely some withdrawal. Per alvin, stopped benzo abruptly on 7/21 by patient decision  - Trilogy at [place]   -f/u OP w [facility]-pulm closely after discharge  -complete prednisone/doxy    Sinus tachycardia to 120's  -TSH wnl.  No improvement w fluids.  D-dimer wnl   - suspect benzo withdrawal and albuterol effect  - , not on home BB  - consider ECHO if persistent    Severe malnutrition - nutrition consultation pending  Significant anxiety - agitation at OSH requiring geodon, home trintellix for now  Acute constipation - improved  Former smoker - per alvin, quit x 6 months ago  HTN - home lisinopril    PT/OT - walked 20 feet w poor exercise tolerance.  PT recommends IRF as he is below baseline.     Expected Discharge Location and Transportation:  [facility] if amenable   Expected Discharge 8/1 (Discharge date is tentative pending patient's medical condition and is subject to change)  Expected Discharge Date: 8/1/2024; Expected Discharge Time:      VTE Prophylaxis:  Pharmacologic VTE prophylaxis orders are present.          AM-PAC 6 Clicks Score (PT): 16 (07/30/24 0836)    CODE STATUS:   Code Status and Medical Interventions: No CPR (Do Not Attempt to Resuscitate); Limited Support; No intubation (DNI), No vasopressors, No dialysis, No cardioversion, No artificial nutrition   Ordered at: 07/27/24 1210     Medical Intervention Limits:    No intubation (DNI)    No vasopressors    No dialysis    No cardioversion    No artificial nutrition     Level Of Support Discussed With:    Health Care Surrogate     Code Status (Patient has no pulse and is not breathing):    No CPR (Do Not Attempt to Resuscitate)     Medical Interventions (Patient has pulse or is breathing):    Limited Support       Kristine Ramirez MD  07/30/24

## 2024-07-30 NOTE — PLAN OF CARE
Goal Outcome Evaluation:  Plan of Care Reviewed With: patient        Progress: improving     Much more alert, oriented this shift, sat in chair most of the day, 2LNC when awake, BIPAP when sleeping, anxiety improved towards the end of the shift, tachy throughout shift          Problem: Adult Inpatient Plan of Care  Goal: Absence of Hospital-Acquired Illness or Injury  Intervention: Identify and Manage Fall Risk  Description: Perform standard risk assessment on admission using a validated tool or comprehensive approach appropriate to the patient; reassess fall risk frequently, with change in status or transfer to another level of care.  Communicate fall injury risk to interprofessional healthcare team.  Determine need for increased observation, equipment and environmental modification, such as low bed, signage and supportive, nonskid footwear.  Adjust safety measures to individual developmental age, stage and identified risk factors.  Reinforce the importance of safety and physical activity with patient and family.  Perform regular intentional rounding to assess need for position change, pain assessment and personal needs, including assistance with toileting.  Recent Flowsheet Documentation  Taken 7/30/2024 1824 by Amalia Murry, RN  Safety Promotion/Fall Prevention:   safety round/check completed   room organization consistent   nonskid shoes/slippers when out of bed   lighting adjusted   fall prevention program maintained   clutter free environment maintained   assistive device/personal items within reach  Taken 7/30/2024 1455 by Amalia Murry, RN  Safety Promotion/Fall Prevention:   safety round/check completed   room organization consistent   nonskid shoes/slippers when out of bed   muscle strengthening facilitated   lighting adjusted   fall prevention program maintained   clutter free environment maintained   assistive device/personal items within reach  Taken 7/30/2024 1211 by Amalia Murry, RN  Safety  Promotion/Fall Prevention:   safety round/check completed   room organization consistent   nonskid shoes/slippers when out of bed   mobility aid in reach   lighting adjusted   fall prevention program maintained   clutter free environment maintained   assistive device/personal items within reach  Taken 7/30/2024 1011 by Amalia Murry, RN  Safety Promotion/Fall Prevention:   safety round/check completed   room organization consistent   nonskid shoes/slippers when out of bed   mobility aid in reach   lighting adjusted   gait belt   fall prevention program maintained   elopement precautions   clutter free environment maintained   assistive device/personal items within reach   activity supervised  Taken 7/30/2024 0836 by Amalia Murry, RN  Safety Promotion/Fall Prevention:   safety round/check completed   toileting scheduled   room organization consistent   nonskid shoes/slippers when out of bed   lighting adjusted   fall prevention program maintained   clutter free environment maintained   assistive device/personal items within reach

## 2024-07-30 NOTE — THERAPY TREATMENT NOTE
Patient Name: Anshu Engel  : 1960    MRN: [id number]                              Today's Date: 2024       Admit Date: 2024    Visit Dx: No diagnosis found.  Patient Active Problem List   Diagnosis    COPD with acute exacerbation    HTN (hypertension)    HLD (hyperlipidemia)    Lung nodule    Acute on chronic respiratory failure with hypercapnia    Respiratory acidosis    Anemia    Acute-on-chronic respiratory failure     Past Medical History:   Diagnosis Date    Allergic     Elevated cholesterol      Past Surgical History:   Procedure Laterality Date    ANKLE SURGERY Left     fracture repair      General Information       Row Name 24 1537          Physical Therapy Time and Intention    Document Type therapy note (daily note)  -     Mode of Treatment physical therapy  -       Row Name 24 1537          General Information    Patient Profile Reviewed yes  -SS     Existing Precautions/Restrictions fall;oxygen therapy device and L/min;other (see comments)  tremulous  -     Barriers to Rehab medically complex;previous functional deficit  -       Row Name 24 1537          Cognition    Orientation Status (Cognition) oriented x 3;other (see comments)  situational confusion noted  -       Row Name 24 1537          Safety Issues, Functional Mobility    Safety Issues Affecting Function (Mobility) awareness of need for assistance;insight into deficits/self-awareness;judgment;positioning of assistive device;problem-solving;safety precaution awareness;safety precautions follow-through/compliance;sequencing abilities  -     Impairments Affecting Function (Mobility) balance;cognition;endurance/activity tolerance;shortness of breath;strength;postural/trunk control;range of motion (ROM)  -     Cognitive Impairments, Mobility Safety/Performance attention;awareness, need for assistance;insight into deficits/self-awareness;judgment;problem-solving/reasoning;safety  precaution awareness;safety precaution follow-through;sequencing abilities  -     Comment, Safety Issues/Impairments (Mobility) increased processing time  -               User Key  (r) = Recorded By, (t) = Taken By, (c) = Cosigned By      Initials Name Provider Type    SS Olivia Mason PT Physical Therapist                   Mobility       Row Name 07/30/24 1538          Bed Mobility    Bed Mobility scooting/bridging;supine-sit  -SS     Scooting/Bridging Navarro (Bed Mobility) contact guard;verbal cues  -SS     Supine-Sit Navarro (Bed Mobility) contact guard;verbal cues  -SS     Assistive Device (Bed Mobility) bed rails;head of bed elevated  -     Comment, (Bed Mobility) VC for sequencing, increased time/effort required  -       Row Name 07/30/24 1538          Bed-Chair Transfer    Bed-Chair Navarro (Transfers) minimum assist (75% patient effort);verbal cues  -SS     Assistive Device (Bed-Chair Transfers) walker, front-wheeled  -SS     Comment, (Bed-Chair Transfer) VC for sequencing  -       Row Name 07/30/24 1538          Sit-Stand Transfer    Sit-Stand Navarro (Transfers) minimum assist (75% patient effort);verbal cues  -SS     Assistive Device (Sit-Stand Transfers) walker, front-wheeled  -SS     Comment, (Sit-Stand Transfer) VC for hand placement, appropriate alignment, lowering with eccentric control  -       Row Name 07/30/24 1538          Gait/Stairs (Locomotion)    Navarro Level (Gait) not tested  -     Comment, (Gait/Stairs) pt declined secondary to fatigue  -               User Key  (r) = Recorded By, (t) = Taken By, (c) = Cosigned By      Initials Name Provider Type    Olivia Hernandez PT Physical Therapist                   Obj/Interventions       Row Name 07/30/24 1539          Motor Skills    Motor Skills functional endurance  -     Functional Endurance modified RPE: 7/10  -     Therapeutic Exercise hip;knee;ankle  -       Row Name 07/30/24 1531           Hip (Therapeutic Exercise)    Hip (Therapeutic Exercise) isometric exercises;strengthening exercise  -     Hip Isometrics (Therapeutic Exercise) bilateral;gluteal sets;10 repetitions  -     Hip Strengthening (Therapeutic Exercise) bilateral;aBduction;aDduction;heel slides;10 repetitions  -       Row Name 07/30/24 1539          Knee (Therapeutic Exercise)    Knee (Therapeutic Exercise) isometric exercises;strengthening exercise  -     Knee Isometrics (Therapeutic Exercise) bilateral;quad sets;10 repetitions  -     Knee Strengthening (Therapeutic Exercise) bilateral;SLR (straight leg raise);10 repetitions  -       Row Name 07/30/24 1539          Ankle (Therapeutic Exercise)    Ankle (Therapeutic Exercise) AROM (active range of motion)  -     Ankle AROM (Therapeutic Exercise) bilateral;dorsiflexion;plantarflexion;10 repetitions  -       Row Name 07/30/24 1539          Balance    Balance Assessment sitting static balance;sitting dynamic balance;standing static balance;sit to stand dynamic balance;standing dynamic balance  -     Static Sitting Balance supervision  -     Dynamic Sitting Balance standby assist  -     Position, Sitting Balance unsupported;sitting edge of bed  -     Sit to Stand Dynamic Balance minimal assist  -     Static Standing Balance contact guard  -     Dynamic Standing Balance minimal assist  -     Position/Device Used, Standing Balance supported;walker, front-wheeled  -     Balance Interventions sitting;standing;sit to stand;supported;static;dynamic  -               User Key  (r) = Recorded By, (t) = Taken By, (c) = Cosigned By      Initials Name Provider Type     Olivia Mason PT Physical Therapist                   Goals/Plan    No documentation.                  Clinical Impression       Row Name 07/30/24 1540          Pain    Pretreatment Pain Rating 0/10 - no pain  -     Posttreatment Pain Rating 0/10 - no pain  -     Pain Intervention(s)  Repositioned;Ambulation/increased activity;Elevated  -     Additional Documentation Pain Scale: Numbers Pre/Post-Treatment (Group)  -       Row Name 07/30/24 1540          Plan of Care Review    Plan of Care Reviewed With patient  -     Progress no change  -     Outcome Evaluation Pt. continues to present below baseline function w/generalized weakness, balance deficits and decreased functional endurance affecting his ability to safely participate in functional mobility. He performed bed mobility and transfers w/min assist. Activity limited by fatigue. He tolerated ther-ex well. He requires increased time/effort with all activity. Continue acute PT POC to progress as tolerated.  -       Row Name 07/30/24 1547          Therapy Assessment/Plan (PT)    Rehab Potential (PT) good, to achieve stated therapy goals  -     Criteria for Skilled Interventions Met (PT) yes;meets criteria;skilled treatment is necessary  -     Therapy Frequency (PT) daily  -       Row Name 07/30/24 1540          Vital Signs    Pre Systolic BP Rehab 121  -SS     Pre Treatment Diastolic BP 77  -SS     Post Systolic BP Rehab 140  -SS     Post Treatment Diastolic BP 88  -SS     Pretreatment Heart Rate (beats/min) 118  -SS     Intratreatment Heart Rate (beats/min) 130  -SS     Posttreatment Heart Rate (beats/min) 124  -SS     Pre SpO2 (%) 94  -SS     O2 Delivery Pre Treatment nasal cannula  2.5L  -SS     Intra SpO2 (%) 85  -SS     O2 Delivery Intra Treatment nasal cannula  2.5L  -SS     Post SpO2 (%) 92  -SS     O2 Delivery Post Treatment nasal cannula  2.5L  -SS     Pre Patient Position Supine  -     Post Patient Position Sitting  -       Row Name 07/30/24 5271          Positioning and Restraints    Pre-Treatment Position in bed  -     Post Treatment Position chair  -SS     In Chair notified nsg;call light within reach;encouraged to call for assist;exit alarm on;waffle cushion;sitting  pt declined chair in reclined position  -                User Key  (r) = Recorded By, (t) = Taken By, (c) = Cosigned By      Initials Name Provider Type    Olivia Hernandez PT Physical Therapist                   Outcome Measures       Row Name 07/30/24 1543 07/30/24 0836       How much help from another person do you currently need...    Turning from your back to your side while in flat bed without using bedrails? 3  -SS 3  -LB    Moving from lying on back to sitting on the side of a flat bed without bedrails? 3  -SS 3  -LB    Moving to and from a bed to a chair (including a wheelchair)? 3  -SS 3  -LB    Standing up from a chair using your arms (e.g., wheelchair, bedside chair)? 3  -SS 3  -LB    Climbing 3-5 steps with a railing? 2  -SS 2  -LB    To walk in hospital room? 2  -SS 2  -LB    AM-PAC 6 Clicks Score (PT) 16  -SS 16  -LB    Highest Level of Mobility Goal 5 --> Static standing  -SS 5 --> Static standing  -LB      Row Name 07/30/24 1543          Functional Assessment    Outcome Measure Options AM-PAC 6 Clicks Basic Mobility (PT)  -SS               User Key  (r) = Recorded By, (t) = Taken By, (c) = Cosigned By      Initials Name Provider Type    Amalia Hannah, RN Registered Nurse    Olivia Hernandez PT Physical Therapist                                 Physical Therapy Education       Title: PT OT SLP Therapies (In Progress)       Topic: Physical Therapy (Done)       Point: Mobility training (Done)       Learning Progress Summary             Patient [place], MANI, YFN,ESSIE,NR by  at 7/30/2024 1543    Comment: Reviewed safety/technique w/bed mobility, transfers, HEP, PT POC    Acceptance, E, VU by  at 7/29/2024 1527    Comment: Pt educated on the importance of mobility during admission                         Point: Home exercise program (Done)       Learning Progress Summary             Patient [place], MANI, YFN,ESSIE,NR by  at 7/30/2024 1543    Comment: Reviewed safety/technique w/bed mobility, transfers, HEP, PT POC                         Point: Body  mechanics (Done)       Learning Progress Summary             Patient Eager, E, VU,DU,NR by  at 7/30/2024 1543    Comment: Reviewed safety/technique w/bed mobility, transfers, HEP, PT POC    Acceptance, E, VU by  at 7/29/2024 1527    Comment: Pt educated on the importance of mobility during admission                         Point: Precautions (Done)       Learning Progress Summary             Patient Eager, E, VU,DU,NR by  at 7/30/2024 1543    Comment: Reviewed safety/technique w/bed mobility, transfers, HEP, PT POC    Acceptance, E, VU by  at 7/29/2024 1527    Comment: Pt educated on the importance of mobility during admission                                         User Key       Initials Effective Dates Name Provider Type Discipline     06/01/21 -  Olivia Mason, PT Physical Therapist PT     05/07/24 -  Jackelyn Cisse PT Student PT Student PT                  PT Recommendation and Plan     Plan of Care Reviewed With: patient  Progress: no change  Outcome Evaluation: Pt. continues to present below baseline function w/generalized weakness, balance deficits and decreased functional endurance affecting his ability to safely participate in functional mobility. He performed bed mobility and transfers w/min assist. Activity limited by fatigue. He tolerated ther-ex well. He requires increased time/effort with all activity. Continue acute PT POC to progress as tolerated.     Time Calculation:         PT Charges       Row Name 07/30/24 1543             Time Calculation    Start Time 1503  -SS      PT Received On 07/30/24  -SS         Timed Charges    37625 - PT Therapeutic Exercise Minutes 15  -SS      56653 - PT Therapeutic Activity Minutes 8  -SS         Total Minutes    Timed Charges Total Minutes 23  -SS       Total Minutes 23  -SS                User Key  (r) = Recorded By, (t) = Taken By, (c) = Cosigned By      Initials Name Provider Type     Olivia Mason, PT Physical Therapist                   Therapy Charges for Today       Code Description Service Date Service Provider Modifiers Qty    61739531470 HC PT THER PROC EA 15 MIN 7/30/2024 Olivia Mason, PT GP 1    71038619530 HC PT THERAPEUTIC ACT EA 15 MIN 7/30/2024 Olivia Mason, PT GP 1            PT G-Codes  Outcome Measure Options: AM-PAC 6 Clicks Basic Mobility (PT)  AM-PAC 6 Clicks Score (PT): 16  AM-PAC 6 Clicks Score (OT): 18  PT Discharge Summary  Anticipated Discharge Disposition (PT): skilled nursing facility    Olivia Mason PT  7/30/2024

## 2024-07-30 NOTE — PLAN OF CARE
Goal Outcome Evaluation:  Plan of Care Reviewed With: patient        Progress: no change  Outcome Evaluation: Pt. continues to present below baseline function w/generalized weakness, balance deficits and decreased functional endurance affecting his ability to safely participate in functional mobility. He performed bed mobility and transfers w/min assist. Activity limited by fatigue. He tolerated ther-ex well. He requires increased time/effort with all activity. Continue acute PT POC to progress as tolerated.      Anticipated Discharge Disposition (PT): skilled nursing facility

## 2024-07-30 NOTE — PROGRESS NOTES
Clinical Nutrition     Patient Name: Anshu Engel  YOB: 1960  MRN: [id number]  Date of Encounter: 07/30/24 19:14 EDT  Admission date: 7/27/2024  Reason for Visit: Identified at risk by screening criteria, Malnutrition Severity Assessment, Physician consult    Assessment   Nutrition Assessment   Admission Diagnosis:  COPD with acute exacerbation [J44.1]    Problem List:    COPD with acute exacerbation    HTN (hypertension)    HLD (hyperlipidemia)    Lung nodule    Acute on chronic respiratory failure with hypercapnia    Respiratory acidosis    Anemia    Acute-on-chronic respiratory failure      PMH:   He  has a past medical history of Allergic and Elevated cholesterol.    PSH:  He  has a past surgical history that includes Ankle surgery (Left).    Substance history: tobacco, etoh remote    Applicable Nutrition History:     SKIN: R ischium/ trochanter area healed wound per WOC, L arm tear    Labs    Labs Reviewed: Yes    Results from last 7 days   Lab Units 07/30/24  0438 07/29/24  0705 07/28/24  1906 07/28/24  0709 07/27/24  1059   GLUCOSE mg/dL 92 132*  --  99 126*   BUN mg/dL 9 14  --  9 9   CREATININE mg/dL 0.49* 0.56*  --  0.57* 0.49*   SODIUM mmol/L 139 138  --  137 136   CHLORIDE mmol/L 91* 96*  --  93* 94*   POTASSIUM mmol/L 3.7 3.9 4.1 3.4* 3.5   ALT (SGPT) U/L  --   --   --   --  9       Results from last 7 days   Lab Units 07/28/24  0709 07/27/24  1059   ALBUMIN g/dL  --  3.7   CHOLESTEROL mg/dL 177  --    TRIGLYCERIDES mg/dL 99  --            Lab Results   Lab Value Date/Time    HGBA1C 4.50 (L) 07/28/2024 0709               Medications    Medications Reviewed: yes    Scheduled Meds:clonazePAM, 0.5 mg, Oral, BID  doxycycline, 100 mg, Oral, Q12H  heparin (porcine), 5,000 Units, Subcutaneous, Q8H  ipratropium-albuterol, 3 mL, Nebulization, 4x Daily - RT  lisinopril, 10 mg, Oral, Daily  megestrol, 40 mg, Oral, Daily  montelukast, 10 mg, Oral, Nightly  predniSONE, 40  mg, Oral, Daily With Breakfast  sodium chloride, 10 mL, Intravenous, Q12H  vilazodone, 20 mg, Oral, Daily      Continuous Infusions:   PRN Meds:.  acetaminophen **OR** acetaminophen **OR** acetaminophen    albuterol    senna-docusate sodium **AND** polyethylene glycol **AND** bisacodyl **AND** bisacodyl    Calcium Replacement - Follow Nurse / BPA Driven Protocol    lactulose    Magnesium Standard Dose Replacement - Follow Nurse / BPA Driven Protocol    ondansetron ODT **OR** ondansetron    Phosphorus Replacement - Follow Nurse / BPA Driven Protocol    Potassium Replacement - Follow Nurse / BPA Driven Protocol    sodium chloride    sodium chloride    Intake/Ouptut 24 hrs (0701 - 0700)   I&O's Reviewed: yes    Intake & Output (last day)         07/30 0701 07/31 0700    P.O. 610    Total Intake 610    Urine 1350    Stool 0    Total Output 1350    Net -740         Urine Unmeasured Occurrence 1 x    Stool Unmeasured Occurrence 1 x           Anthropometrics     Height:  71in  Last Filed Weight:  137lb  Method:  bedscale  BMI:  19.1    UBW: pt reports his UBW used to be ~165lb,  unclear how long ago pt actually weighed this    Weight change:  pt reports he had been losing wt 2nd etoh use, was done to 121lb's at one point, has gained some wt since starting megace, ? 16lb wt gain    Per EMR: pt weighed 145lb 2-1-22 at [facility]    Per EMR: pt weighed 148lb 10-22-21 at [facility]    Nutrition Focused Physical Exam     Date: 7-30-24    Patient meets criteria for malnutrition diagnosis, see MSA note.     Subjective   Reported/Observed/Food/Nutrition Related History:     Pt sitting up in chair, on nasal cannula, has tremors, observed he has eaten most of his dinner    Pt reports he used to weigh ~ 165lb's, has been losing wt over the years 2nd his etoh use, has not had any etoh for 2 years now, just didn't want to eat, had gotten down to 121lb's at one point, has gained some wt since started taking megace, typically eats 2 meals per day  at home, is drinking the boost, but it makes him feel bloated    Per RN: pt swallowing ok    Current Nutrition Prescription     PO: Diet: Regular/House; Fluid Consistency: Thin (IDDSI 0)  Oral Nutrition Supplement: Boost+ 3x/day  Intake:  61% of 7 meals    Assessment & Plan   Nutrition Diagnosis   Date: 7-30-24 Updated:   Problem Malnutrition     Etiology Etoh abuse/COPD   Signs/Symptoms Po intake < 75%, severe muscle wasting, fat loss   Status: New    Goal:   Nutrition to support treatment and Increase intake      Nutrition Intervention      Follow treatment progress, Care plan reviewed, Advise alternate selection, Advised available snacks, Interview for preferences, Menu adjusted, Adjusted supplement    Adjust supplement to Ensure Clear 3x/day    Pt meets criteria for severe chronic malnutrition 2nd Etoh abuse, COPD, evidenced by po intake < 75%, severe muscle wasting, fat loss    Monitoring/Evaluation:   Per protocol, I&O, PO intake, Supplement intake, Pertinent labs, Weight, Skin status, GI status, Symptoms, Swallow function    Ofelia Lyles, VIKKI  Time Spent: 60min

## 2024-07-31 PROCEDURE — 97530 THERAPEUTIC ACTIVITIES: CPT

## 2024-07-31 PROCEDURE — 99232 SBSQ HOSP IP/OBS MODERATE 35: CPT | Performed by: INTERNAL MEDICINE

## 2024-07-31 PROCEDURE — 97116 GAIT TRAINING THERAPY: CPT

## 2024-07-31 PROCEDURE — 94660 CPAP INITIATION&MGMT: CPT

## 2024-07-31 PROCEDURE — 63710000001 PREDNISONE PER 1 MG: Performed by: INTERNAL MEDICINE

## 2024-07-31 PROCEDURE — 94761 N-INVAS EAR/PLS OXIMETRY MLT: CPT

## 2024-07-31 PROCEDURE — 94799 UNLISTED PULMONARY SVC/PX: CPT

## 2024-07-31 PROCEDURE — 25010000002 HEPARIN (PORCINE) PER 1000 UNITS: Performed by: INTERNAL MEDICINE

## 2024-07-31 RX ADMIN — HEPARIN SODIUM 5000 UNITS: 5000 INJECTION INTRAVENOUS; SUBCUTANEOUS at 06:05

## 2024-07-31 RX ADMIN — IPRATROPIUM BROMIDE AND ALBUTEROL SULFATE 3 ML: 2.5; .5 SOLUTION RESPIRATORY (INHALATION) at 12:08

## 2024-07-31 RX ADMIN — IPRATROPIUM BROMIDE AND ALBUTEROL SULFATE 3 ML: 2.5; .5 SOLUTION RESPIRATORY (INHALATION) at 16:05

## 2024-07-31 RX ADMIN — HEPARIN SODIUM 5000 UNITS: 5000 INJECTION INTRAVENOUS; SUBCUTANEOUS at 20:42

## 2024-07-31 RX ADMIN — IPRATROPIUM BROMIDE AND ALBUTEROL SULFATE 3 ML: 2.5; .5 SOLUTION RESPIRATORY (INHALATION) at 19:50

## 2024-07-31 RX ADMIN — DOXYCYCLINE 100 MG: 100 CAPSULE ORAL at 20:42

## 2024-07-31 RX ADMIN — Medication 10 ML: at 08:33

## 2024-07-31 RX ADMIN — MONTELUKAST 10 MG: 10 TABLET, FILM COATED ORAL at 20:42

## 2024-07-31 RX ADMIN — MEGESTROL ACETATE 40 MG: 20 TABLET ORAL at 08:29

## 2024-07-31 RX ADMIN — CLONAZEPAM 0.5 MG: 0.5 TABLET ORAL at 08:29

## 2024-07-31 RX ADMIN — CLONAZEPAM 0.5 MG: 0.5 TABLET ORAL at 20:42

## 2024-07-31 RX ADMIN — DOXYCYCLINE 100 MG: 100 CAPSULE ORAL at 08:29

## 2024-07-31 RX ADMIN — PREDNISONE 40 MG: 20 TABLET ORAL at 08:29

## 2024-07-31 RX ADMIN — VILAZODONE HYDROCHLORIDE 20 MG: 40 TABLET, FILM COATED ORAL at 08:29

## 2024-07-31 RX ADMIN — LISINOPRIL 10 MG: 10 TABLET ORAL at 08:29

## 2024-07-31 RX ADMIN — HEPARIN SODIUM 5000 UNITS: 5000 INJECTION INTRAVENOUS; SUBCUTANEOUS at 13:31

## 2024-07-31 NOTE — PROGRESS NOTES
Malnutrition Severity Assessment    Patient Name:  Anshu Engel  YOB: 1960  MRN: [id number]  Admit Date:  7/27/2024    Patient meets criteria for : Severe Malnutrition      Malnutrition Severity Assessment  Malnutrition Type: Chronic Disease - Related Malnutrition  Malnutrition Type (Last 8 Hours)       Malnutrition Severity Assessment       Row Name 07/30/24 2016       Malnutrition Severity Assessment    Malnutrition Type Chronic Disease - Related Malnutrition      Row Name 07/30/24 2016       Insufficient Energy Intake     Insufficient Energy Intake Findings Severe    Insufficient Energy Intake  <75% of est. energy requirement for > or equal to 3 months      Row Name 07/30/24 2016       Muscle Loss    Loss of Muscle Mass Findings Severe    Restorationism Region Severe - deep hollowing/scooping, lack of muscle to touch, facial bones well defined    Clavicle Bone Region Severe - protruding prominent bone    Patellar Region Severe - prominent bone, square looking, very little muscle definition    Anterior Thigh Region Severe - line/depression along thigh, obviously thin    Posterior Calf Region Moderate - some roundness, slight firmness      Row Name 07/30/24 2016       Fat Loss    Subcutaneous Fat Loss Findings Severe    Orbital Region  Severe - pronounced hollowness/depression, dark circles, loose saggy skin    Upper Arm Region Severe - mostly skin, very little space between folds, fingers touch      Row Name 07/30/24 2016       Criteria Met (Must meet criteria for severity in at least 2 of these categories: M Wasting, Fat Loss, Fluid, Secondary Signs, Wt. Status, Intake)    Patient meets criteria for  Severe Malnutrition                    Electronically signed by:  Ofelia Lyles RD  07/30/24 20:18 EDT

## 2024-07-31 NOTE — THERAPY TREATMENT NOTE
Patient Name: Anshu Engel  : 1960    MRN: [id number]                              Today's Date: 2024       Admit Date: 2024    Visit Dx:     ICD-10-CM ICD-9-CM   1. COPD with acute exacerbation  J44.1 491.21   2. Respiratory acidosis  E87.29 276.2     Patient Active Problem List   Diagnosis    COPD with acute exacerbation    HTN (hypertension)    HLD (hyperlipidemia)    Lung nodule    Acute on chronic respiratory failure with hypercapnia    Respiratory acidosis    Anemia    Acute-on-chronic respiratory failure     Past Medical History:   Diagnosis Date    Allergic     Elevated cholesterol      Past Surgical History:   Procedure Laterality Date    ANKLE SURGERY Left     fracture repair      General Information       Row Name 24 164          Physical Therapy Time and Intention    Document Type therapy note (daily note) (P)   -     Mode of Treatment physical therapy;individual therapy (P)   -       Row Name 24 164          General Information    Patient Profile Reviewed yes (P)   -     Existing Precautions/Restrictions fall;oxygen therapy device and L/min;other (see comments) (P)   tremulous  -     Barriers to Rehab medically complex;previous functional deficit (P)   -       Row Name 24 164          Cognition    Orientation Status (Cognition) oriented x 3 (P)   -       Row Name 24          Safety Issues, Functional Mobility    Safety Issues Affecting Function (Mobility) insight into deficits/self-awareness;awareness of need for assistance;safety precaution awareness;safety precautions follow-through/compliance;judgment;sequencing abilities;problem-solving (P)   -     Impairments Affecting Function (Mobility) balance;cognition;endurance/activity tolerance;shortness of breath;strength;postural/trunk control;range of motion (ROM) (P)   -     Comment, Safety Issues/Impairments (Mobility) Increased time for processing (P)   -               User  Key  (r) = Recorded By, (t) = Taken By, (c) = Cosigned By      Initials Name Provider Type     Jackelyn Cisse, PT Student PT Student                   Mobility       Row Name 07/31/24 1650          Bed Mobility    Bed Mobility supine-sit;sit-supine;scooting/bridging (P)   -HM     Scooting/Bridging [place] (Bed Mobility) contact guard;verbal cues (P)   -HM     Supine-Sit [place] (Bed Mobility) contact guard;verbal cues (P)   -HM     Sit-Supine [place] (Bed Mobility) contact guard;verbal cues (P)   -     Assistive Device (Bed Mobility) bed rails;head of bed elevated (P)   -     Comment, (Bed Mobility) VC for sequencing with no physical; assistance needed but increased time for/effort required (P)   -       Row Name 07/31/24 1650          Transfers    Comment, (Transfers) VC for hand placement and sequencing (P)   -       Row Name 07/31/24 1650          Sit-Stand Transfer    Sit-Stand [place] (Transfers) minimum assist (75% patient effort);verbal cues (P)   -     Assistive Device (Sit-Stand Transfers) walker, front-wheeled (P)   -HM     Comment, (Sit-Stand Transfer) VC for hand placement and sequencing (P)   -       Row Name 07/31/24 1650          Gait/Stairs (Locomotion)    [place] Level (Gait) contact guard;verbal cues (P)   -     Assistive Device (Gait) walker, front-wheeled (P)   -HM     Distance in Feet (Gait) 60 (P)   -     Deviations/Abnormal Patterns (Gait) bilateral deviations;base of support, narrow;gait speed decreased;antalgic;stride length decreased (P)   -HM     Bilateral Gait Deviations forward flexed posture;heel strike decreased (P)   -     Comment, (Gait/Stairs) Pt ambulated with a step through gait pattern. Pt O2 sat stayed >90 throughout ambulation with HR raising to 135. Pt reports of feeling hot upon return to the room needing to abruptly sit with BP assessed and stable at 114/97. Symptoms decreased with increased time to sit. VC for upright posture  and deep breathing (P)   -HM       Row Name 07/31/24 1650          Mobility    Extremity Weight-bearing Status left upper extremity (P)   -               User Key  (r) = Recorded By, (t) = Taken By, (c) = Cosigned By      Initials Name Provider Type     Jackelyn Cisse, PT Student PT Student                   Obj/Interventions       Row Name 07/31/24 1653          Balance    Balance Assessment sitting static balance;sitting dynamic balance;sit to stand dynamic balance;standing static balance;standing dynamic balance (P)   -     Static Sitting Balance supervision (P)   -     Dynamic Sitting Balance supervision (P)   -     Position, Sitting Balance unsupported;sitting edge of bed (P)   -     Sit to Stand Dynamic Balance minimal assist;verbal cues (P)   -     Static Standing Balance contact guard (P)   -     Dynamic Standing Balance contact guard;verbal cues (P)   -     Position/Device Used, Standing Balance unsupported;walker, front-wheeled (P)   -HM     Balance Interventions sitting;standing;sit to stand;occupation based/functional task (P)   -     Comment, Balance Unsteady throughout ambulation with no overt LOB (P)   -HM               User Key  (r) = Recorded By, (t) = Taken By, (c) = Cosigned By      Initials Name Provider Type     Jackelyn Cisse, PT Student PT Student                   Goals/Plan    No documentation.                  Clinical Impression       Row Name 07/31/24 1654          Pain    Pretreatment Pain Rating 0/10 - no pain (P)   -HM     Posttreatment Pain Rating 0/10 - no pain (P)   -       Row Name 07/31/24 165          Plan of Care Review    Plan of Care Reviewed With patient (P)   -     Progress improving (P)   -     Outcome Evaluation Pt able to increase ambulation this session to 60' with CGA and FWW. Pt O2 sat >90% with ambulation on 3L with HR increasing to 135. Pt continues to present below baseline with generalized strength, balance deficits, and poor  activity tolerance. Further IPPT warranted during admission. PT recommended DC to SNF for best outcomes (P)   -HM       Row Name 07/31/24 1654          Vital Signs    Pre Systolic BP Rehab 112 (P)   -HM     Pre Treatment Diastolic BP 66 (P)   -HM     Post Systolic BP Rehab 114 (P)   -HM     Post Treatment Diastolic BP 97 (P)   -HM     Pretreatment Heart Rate (beats/min) 116 (P)   -HM     Intratreatment Heart Rate (beats/min) 135 (P)   -HM     Posttreatment Heart Rate (beats/min) 116 (P)   -HM     Pre SpO2 (%) 95 (P)   -HM     O2 Delivery Pre Treatment nasal cannula (P)   -HM     Intra SpO2 (%) 91 (P)   -HM     O2 Delivery Intra Treatment nasal cannula (P)   -HM     Post SpO2 (%) 94 (P)   -HM     O2 Delivery Post Treatment nasal cannula (P)   -HM     Pre Patient Position Supine (P)   -HM     Intra Patient Position Standing (P)   -HM     Post Patient Position Supine (P)   -HM       Row Name 07/31/24 4989          Positioning and Restraints    Pre-Treatment Position in bed (P)   -HM     Post Treatment Position bed (P)   -HM     In Bed notified nsg;supine;call light within reach;encouraged to call for assist;exit alarm on;legs elevated (P)   -HM               User Key  (r) = Recorded By, (t) = Taken By, (c) = Cosigned By      Initials Name Provider Type     Jackelyn Cisse, PT Student PT Student                   Outcome Measures       Row Name 07/31/24 5196          How much help from another person do you currently need...    Turning from your back to your side while in flat bed without using bedrails? 3 (P)   -HM     Moving from lying on back to sitting on the side of a flat bed without bedrails? 3 (P)   -HM     Moving to and from a bed to a chair (including a wheelchair)? 3 (P)   -HM     Standing up from a chair using your arms (e.g., wheelchair, bedside chair)? 3 (P)   -HM     Climbing 3-5 steps with a railing? 2 (P)   -HM     To walk in hospital room? 3 (P)   -HM     AM-PAC 6 Clicks Score (PT) 17 (P)   -HM      Highest Level of Mobility Goal 5 --> Static standing (P)   -       Row Name 07/31/24 1657          Functional Assessment    Outcome Measure Options AM-PAC 6 Clicks Basic Mobility (PT) (P)   -               User Key  (r) = Recorded By, (t) = Taken By, (c) = Cosigned By      Initials Name Provider Type     Jackelyn Cisse, PT Student PT Student                                 Physical Therapy Education       Title: PT OT SLP Therapies (In Progress)       Topic: Physical Therapy (Done)       Point: Mobility training (Done)       Learning Progress Summary             Patient Acceptance, E, VU by  at 7/31/2024 1657    Comment: Pt educated on the importance of safe mobility during admission    Eager, E, VU,DU,NR by  at 7/30/2024 1543    Comment: Reviewed safety/technique w/bed mobility, transfers, HEP, PT POC    Acceptance, E, VU by  at 7/29/2024 1527    Comment: Pt educated on the importance of mobility during admission                         Point: Home exercise program (Done)       Learning Progress Summary             Patient Acceptance, E, VU by  at 7/31/2024 1657    Comment: Pt educated on the importance of safe mobility during admission    Eager, E, VU,DU,NR by  at 7/30/2024 1543    Comment: Reviewed safety/technique w/bed mobility, transfers, HEP, PT POC                         Point: Body mechanics (Done)       Learning Progress Summary             Patient Acceptance, E, VU by  at 7/31/2024 1657    Comment: Pt educated on the importance of safe mobility during admission    Eager, E, VU,DU,NR by  at 7/30/2024 1543    Comment: Reviewed safety/technique w/bed mobility, transfers, HEP, PT POC    Acceptance, E, VU by  at 7/29/2024 1527    Comment: Pt educated on the importance of mobility during admission                         Point: Precautions (Done)       Learning Progress Summary             Patient Acceptance, E, VU by  at 7/31/2024 1657    Comment: Pt educated on the importance of  safe mobility during admission    MANI Sousa VU,DU,NR by  at 7/30/2024 1543    Comment: Reviewed safety/technique w/bed mobility, transfers, HEP, PT POC    MANI Hernandez VU by  at 7/29/2024 1527    Comment: Pt educated on the importance of mobility during admission                                         User Key       Initials Effective Dates Name Provider Type Discipline     06/01/21 -  Olivia Mason, PT Physical Therapist PT     05/07/24 -  Jackelyn Cisse PT Student PT Student PT                  PT Recommendation and Plan  Planned Therapy Interventions (PT): balance training, bed mobility training, gait training, home exercise program, joint mobilization, lumbar stabilization, manual therapy techniques, motor coordination training, stretching, strengthening, stair training, ROM (range of motion), postural re-education, patient/family education, neuromuscular re-education, transfer training  Plan of Care Reviewed With: (P) patient  Progress: (P) improving  Outcome Evaluation: (P) Pt able to increase ambulation this session to 60' with CGA and FWW. Pt O2 sat >90% with ambulation on 3L with HR increasing to 135. Pt continues to present below baseline with generalized strength, balance deficits, and poor activity tolerance. Further IPPT warranted during admission. PT recommended DC to SNF for best outcomes     Time Calculation:   PT Evaluation Complexity  History, PT Evaluation Complexity: 1-2 personal factors and/or comorbidities  Examination of Body Systems (PT Eval Complexity): total of 3 or more elements  Clinical Presentation (PT Evaluation Complexity): stable  Clinical Decision Making (PT Evaluation Complexity): low complexity  Overall Complexity (PT Evaluation Complexity): low complexity     PT Charges       Row Name 07/31/24 1620             Time Calculation    Start Time 1620 (P)   -      PT Received On 07/31/24 (P)   -      PT Goal Re-Cert Due Date 08/08/24 (P)   -         Timed Charges     94005 - Gait Training Minutes  15 (P)   -      15136 - PT Therapeutic Activity Minutes 8 (P)   -HM         Total Minutes    Timed Charges Total Minutes 23 (P)   -HM       Total Minutes 23 (P)   -HM                User Key  (r) = Recorded By, (t) = Taken By, (c) = Cosigned By      Initials Name Provider Type     Jackelyn Cisse, PT Student PT Student                  Therapy Charges for [facility]       Code Description Service Date Service Provider Modifiers Qty    [id number]  GAIT TRAINING EA 15 MIN 7/31/2024 Jackelyn Cisse, PT Student GP 1    [id number]  PT THERAPEUTIC ACT EA 15 MIN 7/31/2024 Jackelyn Cisse, PT Student GP 1            PT G-Codes  Outcome Measure Options: (P) AM-PAC 6 Clicks Basic Mobility (PT)  AM-PAC 6 Clicks Score (PT): (P) 17  AM-PAC 6 Clicks Score (OT): 18  PT Discharge Summary  Anticipated Discharge Disposition (PT): (P) skilled nursing facility    Jackelyn Cisse PT Student  7/31/2024

## 2024-07-31 NOTE — PLAN OF CARE
Goal Outcome Evaluation:                                              Problem: Adult Inpatient Plan of Care  Goal: Absence of Hospital-Acquired Illness or Injury  Intervention: Identify and Manage Fall Risk  Recent Flowsheet Documentation  Taken 7/31/2024 0200 by Andre Lux RN  Safety Promotion/Fall Prevention:   activity supervised   safety round/check completed   toileting scheduled  Taken 7/31/2024 0000 by Andre Lux RN  Safety Promotion/Fall Prevention:   activity supervised   safety round/check completed   toileting scheduled  Taken 7/30/2024 2200 by Andre Lux RN  Safety Promotion/Fall Prevention:   activity supervised   safety round/check completed   toileting scheduled  Taken 7/30/2024 2000 by Andre Lux RN  Safety Promotion/Fall Prevention:   activity supervised   safety round/check completed   toileting scheduled  Intervention: Prevent Skin Injury  Recent Flowsheet Documentation  Taken 7/31/2024 0200 by Andre Lux RN  Body Position: position changed independently  Skin Protection: adhesive use limited  Taken 7/31/2024 0000 by Andre Lux RN  Body Position: position changed independently  Skin Protection: adhesive use limited  Taken 7/30/2024 2200 by Andre Lux RN  Body Position: position changed independently  Skin Protection: adhesive use limited  Taken 7/30/2024 2000 by Andre Lux RN  Body Position: supine  Skin Protection: adhesive use limited  Intervention: Prevent and Manage VTE (Venous Thromboembolism) Risk  Recent Flowsheet Documentation  Taken 7/31/2024 0200 by Andre Lux RN  VTE Prevention/Management:   bilateral   sequential compression devices off  Taken 7/31/2024 0000 by Andre Lux RN  VTE Prevention/Management:   bilateral   sequential compression devices off  Taken 7/30/2024 2200 by Andre Lux RN  VTE Prevention/Management:   bilateral   sequential compression devices off  Taken 7/30/2024 2000 by Andre Lux RN  VTE  Prevention/Management:   bilateral   sequential compression devices off  Intervention: Prevent Infection  Recent Flowsheet Documentation  Taken 7/31/2024 0200 by Andre Lux RN  Infection Prevention: environmental surveillance performed  Taken 7/31/2024 0000 by Andre Lux RN  Infection Prevention: environmental surveillance performed  Taken 7/30/2024 2200 by Andre Lux RN  Infection Prevention: environmental surveillance performed  Taken 7/30/2024 2000 by Andre Lux RN  Infection Prevention: environmental surveillance performed  Goal: Optimal Comfort and Wellbeing  Intervention: Monitor Pain and Promote Comfort  Recent Flowsheet Documentation  Taken 7/31/2024 0200 by Andre Lux RN  Pain Management Interventions: quiet environment facilitated  Taken 7/31/2024 0000 by Andre Lux RN  Pain Management Interventions: quiet environment facilitated  Taken 7/30/2024 2200 by Andre Lux RN  Pain Management Interventions: quiet environment facilitated  Taken 7/30/2024 2000 by Andre Lux RN  Pain Management Interventions: quiet environment facilitated  Intervention: Provide Person-Centered Care  Recent Flowsheet Documentation  Taken 7/31/2024 0200 by Andre Lux RN  Trust Relationship/Rapport: care explained  Taken 7/31/2024 0000 by Andre Lux RN  Trust Relationship/Rapport: care explained  Taken 7/30/2024 2200 by Andre Lux RN  Trust Relationship/Rapport: care explained  Taken 7/30/2024 2000 by Andre Lux RN  Trust Relationship/Rapport: care explained    VSS, voids well, NC when awake, Bipap at night, will continue to monitor for changes.

## 2024-07-31 NOTE — PLAN OF CARE
Problem: COPD (Chronic Obstructive Pulmonary Disease) Comorbidity  Goal: Maintenance of COPD Symptom Control  Intervention: Maintain COPD-Symptom Control  Recent Flowsheet Documentation  Taken 7/31/2024 1510 by Brigida Phillips, RN  Supportive Measures:   active listening utilized   decision-making supported   goal-setting facilitated   problem-solving facilitated   verbalization of feelings encouraged   Goal Outcome Evaluation:  Plan of Care Reviewed With: patient        Progress: no change  Outcome Evaluation: Pt wants to go home at d/c. discussed home palliative services.  Left info for pt to review through [facility] palliative service. Pt states he has been sleeping better with bipap.  Anxious worse at times. continue Klonipin bid.  Pt is eating fair[place] [facility] [place]       Palliative Team Meeting [id number]  MD, APRN, RN   Please call Palliative after hours at [phone]

## 2024-07-31 NOTE — PLAN OF CARE
Goal Outcome Evaluation:  Plan of Care Reviewed With: (P) patient        Progress: (P) improving  Outcome Evaluation: (P) Pt able to increase ambulation this session to 60' with CGA and FWW. Pt O2 sat >90% with ambulation on 3L with HR increasing to 135. Pt continues to present below baseline with generalized strength, balance deficits, and poor activity tolerance. Further IPPT warranted during admission. PT recommended DC to SNF for best outcomes      Anticipated Discharge Disposition (PT): (P) skilled nursing facility

## 2024-07-31 NOTE — CASE MANAGEMENT/SOCIAL WORK
Continued Stay Note  [facility]     Patient Name: Anshu Engel  MRN: [id number]  Today's Date: 7/31/2024    Admit Date: 7/27/2024    Plan: Home with Home Health vs IPR   Discharge Plan       Row Name 07/31/24 1046       Plan    Plan Comments Precious with [facility] has accepted referral for trilogy and will arrange for home setup. CM has spoken with Pt and Toya about possible SNF placement and at this time they both declined. They are agreeable to HH with [facility]. Manuela has the referral. CM will continue to follow and will arrange transport if needed at discharge.                   Discharge Codes    No documentation.                 Expected Discharge Date and Time       Expected Discharge Date Expected Discharge Time    Aug 1, 2024               Kellen Aragon RN

## 2024-07-31 NOTE — PROGRESS NOTES
[facility] Medicine Services  PROGRESS NOTE    Patient Name: Anshu Engel  : 1960  MRN: [id number]    Date of Admission: 2024  Primary Care Physician: Provider, No Known    Subjective   Subjective     CC:  Confusion, hypercapnea, transfer from [facility]    HPI:  tolerated BIPAP last night.  Tolerating oxygen 3L nc today.  He and carlos reportedly are intent on him going home and not to rehab.     Currently asleep on 3L nc.  I did not try to wake him.       Objective   Objective     Vital Signs:   Temp:  [97.8 °F (36.6 °C)-99 °F (37.2 °C)] 97.8 °F (36.6 °C)  Heart Rate:  [] 114  Resp:  [16-19] 17  BP: (102-156)/(62-88) 156/83  Flow (L/min):  [2-3] 3     Physical Exam:  Constitutional: Chronically ill appearing , thin, asleep.  Labored inspirations, prolonged expirations.  Poor breath sounds anteriorly, no wheeze   HENT: NCAT,   Cardiovascular: tachycardic 110   Gastrointestinal: Soft, nontender, nondistended    Results Reviewed:  LAB RESULTS:      Lab 24  1855 24  0724  0709 24  1059   WBC 9.57  --  7.52 8.08 8.99   HEMOGLOBIN 10.1*  --  10.1* 10.0* 10.2*   HEMATOCRIT 30.9*  --  31.1* 29.3* 30.7*   PLATELETS 292  --  280 271 244   NEUTROS ABS  --   --   --   --  7.85*   IMMATURE GRANS (ABS)  --   --   --   --  0.04   LYMPHS ABS  --   --   --   --  0.40*   MONOS ABS  --   --   --   --  0.67   EOS ABS  --   --   --   --  0.02   MCV 91.7  --  91.7 88.0 90.8   PROCALCITONIN  --   --   --   --  0.05   D DIMER QUANT  --  0.31  --   --   --          Lab 24  0438 24  0705 24  1906 24  0709 24  1059   SODIUM 139 138  --  137 136   POTASSIUM 3.7 3.9 4.1 3.4* 3.5   CHLORIDE 91* 96*  --  93* 94*   CO2 42.0* 39.0*  --  38.0* 39.0*   ANION GAP 6.0 3.0*  --  6.0 3.0*   BUN 9 14  --  9 9   CREATININE 0.49* 0.56*  --  0.57* 0.49*   EGFR 116.0 111.4  --  110.8 116.0   GLUCOSE 92 132*  --  99 126*   CALCIUM 8.6  8.9  --  8.8 8.5*   HEMOGLOBIN A1C  --   --   --  4.50*  --    TSH  --   --   --  0.557  --          Lab 07/27/24  1059   TOTAL PROTEIN 5.4*   ALBUMIN 3.7   GLOBULIN 1.7   ALT (SGPT) 9   AST (SGOT) 13   BILIRUBIN 0.3   ALK PHOS 53             Lab 07/28/24  0709   CHOLESTEROL 177   LDL CHOL 109*   HDL CHOL 50   TRIGLYCERIDES 99             Lab 07/28/24  0259 07/27/24  1321 07/27/24  1059   PH, ARTERIAL 7.420 7.296* 7.297*   PCO2, ARTERIAL 59.0* 77.8* 76.1*   PO2 ART 80.6* 94.5 80.0*   FIO2 28 28 28   HCO3 ART 38.3* 37.9* 37.2*   BASE EXCESS ART 11.9* 9.1* 8.6*   CARBOXYHEMOGLOBIN 1.4 1.4 1.4     Brief Urine Lab Results       None            Microbiology Results Abnormal       None            No radiology results from the last 24 hrs        Current medications:  Scheduled Meds:clonazePAM, 0.5 mg, Oral, BID  doxycycline, 100 mg, Oral, Q12H  heparin (porcine), 5,000 Units, Subcutaneous, Q8H  ipratropium-albuterol, 3 mL, Nebulization, 4x Daily - RT  lisinopril, 10 mg, Oral, Daily  megestrol, 40 mg, Oral, Daily  montelukast, 10 mg, Oral, Nightly  predniSONE, 40 mg, Oral, Daily With Breakfast  sodium chloride, 10 mL, Intravenous, Q12H  vilazodone, 20 mg, Oral, Daily      Continuous Infusions:   PRN Meds:.  acetaminophen **OR** acetaminophen **OR** acetaminophen    albuterol    senna-docusate sodium **AND** polyethylene glycol **AND** bisacodyl **AND** bisacodyl    Calcium Replacement - Follow Nurse / BPA Driven Protocol    lactulose    Magnesium Standard Dose Replacement - Follow Nurse / BPA Driven Protocol    ondansetron ODT **OR** ondansetron    Phosphorus Replacement - Follow Nurse / BPA Driven Protocol    Potassium Replacement - Follow Nurse / BPA Driven Protocol    sodium chloride    sodium chloride    Assessment & Plan   Assessment & Plan     Active Hospital Problems    Diagnosis  POA    **COPD with acute exacerbation [J44.1]  Yes    HTN (hypertension) [I10]  Unknown    HLD (hyperlipidemia) [E78.5]  Unknown    Lung  nodule [R91.1]  Unknown    Acute on chronic respiratory failure with hypercapnia [J96.22]  Unknown    Respiratory acidosis [E87.29]  Unknown    Anemia [D64.9]  Unknown    Acute-on-chronic respiratory failure [J96.20]  Yes      Resolved Hospital Problems   No resolved problems to display.        Brief Hospital Course to date:  Anshu Engel is a 64 y.o. male w COPD on chronic 2 liters n/c, h/o granulomatous lung nodules, anxiety previously on benzos.  Was admitted to [facility] for hypercapnea/dyspnea.  After 4 days, transferred to [facility]     Patient has a history of chronic hypoxic respiratory failure due to COPD.  He is currently being treated for COPD exacerbation.  He has tried and failed bilevel therapy.  Due to his most recent ABG revealing a PaCO2 of 59, he is at risk for worsening chronic respiratory failure.  Furthermore, the patientrequires continuous alarms, backup battery and portability which are not possible with Bilevel/Rad devices.  I am prescribing non-invasive positive pressure ventilation therapy to decrease the chance of continued unplanned expensive medical encounters and worsening of the patient's condition.    COPD exacerbation  acute on chronic hypercapneic/hypoxic resp failure   Suspected benzo withdrawal contributing to anxiety   - Patient's clonazepam was prescribed 0.5 mg BID prescribed 6/7 x #30 days per PDMP. UDS negative on admission to [facility] so very likely some withdrawal. Per fiance, stopped benzo abruptly on 7/21 by patient decision  - Trilogy to be delivered [place] his home at [place]  - form signed.   -f/u OP w [facility]-pulm closely after discharge  -complete prednisone/doxy    Sinus tachycardia to 120's  -TSH wnl.  No improvement w fluids.  D-dimer wnl   - suspect benzo withdrawal and albuterol effect  - , not on home BB  - consider ECHO if persistent    Severe malnutrition - nutrition consultation pending  Significant anxiety - agitation at OSH requiring geodon, home  trintellix for now  Acute constipation - improved  Former smoker - per alvin, quit x 6 months ago  HTN - home lisinopril    PT/OT - walked 20 feet w poor exercise tolerance.  PT recommends IRF as he is below baseline.  Pt and fiancee have declined     Expected Discharge Location and Transportation:  home w HH/PT    Expected Discharge 8/1 (Discharge date is tentative pending patient's medical condition and is subject to change)  Expected Discharge Date: 8/1/2024; Expected Discharge Time:      VTE Prophylaxis:  Pharmacologic VTE prophylaxis orders are present.         AM-PAC 6 Clicks Score (PT): 16 (07/30/24 1958)    CODE STATUS:   Code Status and Medical Interventions: No CPR (Do Not Attempt to Resuscitate); Limited Support; No intubation (DNI), No vasopressors, No dialysis, No cardioversion, No artificial nutrition   Ordered at: 07/27/24 1210     Medical Intervention Limits:    No intubation (DNI)    No vasopressors    No dialysis    No cardioversion    No artificial nutrition     Level Of Support Discussed With:    Health Care Surrogate     Code Status (Patient has no pulse and is not breathing):    No CPR (Do Not Attempt to Resuscitate)     Medical Interventions (Patient has pulse or is breathing):    Limited Support       Kristine Ramirez MD  07/31/24

## 2024-08-01 LAB
QT INTERVAL: 294 MS
QTC INTERVAL: 406 MS

## 2024-08-01 PROCEDURE — 25010000002 HEPARIN (PORCINE) PER 1000 UNITS: Performed by: INTERNAL MEDICINE

## 2024-08-01 PROCEDURE — 94799 UNLISTED PULMONARY SVC/PX: CPT

## 2024-08-01 PROCEDURE — 94664 DEMO&/EVAL PT USE INHALER: CPT

## 2024-08-01 PROCEDURE — 94761 N-INVAS EAR/PLS OXIMETRY MLT: CPT

## 2024-08-01 PROCEDURE — 99232 SBSQ HOSP IP/OBS MODERATE 35: CPT | Performed by: INTERNAL MEDICINE

## 2024-08-01 PROCEDURE — 63710000001 PREDNISONE PER 1 MG: Performed by: INTERNAL MEDICINE

## 2024-08-01 PROCEDURE — 94660 CPAP INITIATION&MGMT: CPT

## 2024-08-01 RX ORDER — PREDNISONE 20 MG/1
20 TABLET ORAL
Status: DISCONTINUED | OUTPATIENT
Start: 2024-08-02 | End: 2024-08-02 | Stop reason: HOSPADM

## 2024-08-01 RX ADMIN — VILAZODONE HYDROCHLORIDE 20 MG: 40 TABLET, FILM COATED ORAL at 08:16

## 2024-08-01 RX ADMIN — MEGESTROL ACETATE 40 MG: 20 TABLET ORAL at 08:16

## 2024-08-01 RX ADMIN — CLONAZEPAM 0.5 MG: 0.5 TABLET ORAL at 08:15

## 2024-08-01 RX ADMIN — IPRATROPIUM BROMIDE AND ALBUTEROL SULFATE 3 ML: 2.5; .5 SOLUTION RESPIRATORY (INHALATION) at 19:03

## 2024-08-01 RX ADMIN — HEPARIN SODIUM 5000 UNITS: 5000 INJECTION INTRAVENOUS; SUBCUTANEOUS at 05:48

## 2024-08-01 RX ADMIN — PREDNISONE 40 MG: 20 TABLET ORAL at 08:15

## 2024-08-01 RX ADMIN — HEPARIN SODIUM 5000 UNITS: 5000 INJECTION INTRAVENOUS; SUBCUTANEOUS at 20:16

## 2024-08-01 RX ADMIN — Medication 10 ML: at 08:16

## 2024-08-01 RX ADMIN — IPRATROPIUM BROMIDE AND ALBUTEROL SULFATE 3 ML: 2.5; .5 SOLUTION RESPIRATORY (INHALATION) at 12:45

## 2024-08-01 RX ADMIN — HEPARIN SODIUM 5000 UNITS: 5000 INJECTION INTRAVENOUS; SUBCUTANEOUS at 13:57

## 2024-08-01 RX ADMIN — MONTELUKAST 10 MG: 10 TABLET, FILM COATED ORAL at 20:16

## 2024-08-01 RX ADMIN — CLONAZEPAM 0.5 MG: 0.5 TABLET ORAL at 20:16

## 2024-08-01 RX ADMIN — LISINOPRIL 10 MG: 10 TABLET ORAL at 08:15

## 2024-08-01 RX ADMIN — IPRATROPIUM BROMIDE AND ALBUTEROL SULFATE 3 ML: 2.5; .5 SOLUTION RESPIRATORY (INHALATION) at 15:50

## 2024-08-01 NOTE — PLAN OF CARE
Goal Outcome Evaluation:      Patient alert and oriented.On 3 L of oxygen. Discontinued Telemetry monitoring.Uses BIPAP at night.

## 2024-08-01 NOTE — PLAN OF CARE
Goal Outcome Evaluation:                                              Problem: Adult Inpatient Plan of Care  Goal: Absence of Hospital-Acquired Illness or Injury  Intervention: Identify and Manage Fall Risk  Recent Flowsheet Documentation  Taken 8/1/2024 0200 by Andre Lux RN  Safety Promotion/Fall Prevention:   activity supervised   safety round/check completed   toileting scheduled  Taken 8/1/2024 0000 by Andre Lux RN  Safety Promotion/Fall Prevention:   activity supervised   safety round/check completed   toileting scheduled  Taken 7/31/2024 2200 by Andre Lux RN  Safety Promotion/Fall Prevention:   activity supervised   safety round/check completed   toileting scheduled  Taken 7/31/2024 2000 by Andre Lux RN  Safety Promotion/Fall Prevention:   activity supervised   safety round/check completed   toileting scheduled  Intervention: Prevent Skin Injury  Recent Flowsheet Documentation  Taken 8/1/2024 0200 by Andre Lux RN  Body Position: position changed independently  Skin Protection: adhesive use limited  Taken 8/1/2024 0000 by Andre Lux RN  Body Position: position changed independently  Skin Protection: adhesive use limited  Taken 7/31/2024 2200 by Andre Lux RN  Body Position: position changed independently  Skin Protection: adhesive use limited  Taken 7/31/2024 2000 by Andre Lux RN  Body Position: position changed independently  Skin Protection: adhesive use limited  Intervention: Prevent and Manage VTE (Venous Thromboembolism) Risk  Recent Flowsheet Documentation  Taken 8/1/2024 0200 by Andre Lux RN  VTE Prevention/Management:   bilateral   sequential compression devices off  Taken 8/1/2024 0000 by Andre Lux RN  VTE Prevention/Management:   bilateral   sequential compression devices off  Taken 7/31/2024 2200 by Andre Lux RN  VTE Prevention/Management:   bilateral   sequential compression devices off  Taken 7/31/2024 2000 by Andre Lux  KHUSHBOO RN  VTE Prevention/Management:   bilateral   sequential compression devices off  Intervention: Prevent Infection  Recent Flowsheet Documentation  Taken 8/1/2024 0200 by Andre Lux RN  Infection Prevention: environmental surveillance performed  Taken 8/1/2024 0000 by Andre Lux RN  Infection Prevention: environmental surveillance performed  Taken 7/31/2024 2200 by Andre Lux RN  Infection Prevention: environmental surveillance performed  Taken 7/31/2024 2000 by Andre Lux RN  Infection Prevention: environmental surveillance performed  Goal: Optimal Comfort and Wellbeing  Intervention: Monitor Pain and Promote Comfort  Recent Flowsheet Documentation  Taken 8/1/2024 0200 by Andre Lux RN  Pain Management Interventions: quiet environment facilitated  Taken 8/1/2024 0000 by Andre Lux RN  Pain Management Interventions: quiet environment facilitated  Taken 7/31/2024 2200 by Andre Lux RN  Pain Management Interventions: quiet environment facilitated  Taken 7/31/2024 2000 by Andre Lux RN  Pain Management Interventions: quiet environment facilitated  Intervention: Provide Person-Centered Care  Recent Flowsheet Documentation  Taken 8/1/2024 0200 by Andre Lux RN  Trust Relationship/Rapport: care explained  Taken 8/1/2024 0000 by Andre Lux RN  Trust Relationship/Rapport: care explained  Taken 7/31/2024 2200 by Andre Lux RN  Trust Relationship/Rapport: care explained  Taken 7/31/2024 2000 by Andre Lux RN  Trust Relationship/Rapport: care explained     VSS, voids well, rested throughout the night, pain managed with prn medications.

## 2024-08-01 NOTE — CASE MANAGEMENT/SOCIAL WORK
Continued Stay Note  [facility]     Patient Name: Anshu Engel  MRN: [id number]  Today's Date: 8/1/2024    Admit Date: 7/27/2024    Plan: Home with [facility]   Discharge Plan       Row Name 08/01/24 1045       Plan    Plan Home with [facility]    Patient/Family in Agreement with Plan yes    Plan Comments Spoke to Rubens at [facility] and patient's Trlogy has been approved and SO requested that [facility] supply patient's home oxygen also. Spoke with Lolis by phone and she is in agreement with the plan and notified about the Trilogy and home oxygen. Plan is home with [facility]. CM will continue to follow.    Final Discharge Disposition Code 06 - home with home health care                   Discharge Codes    No documentation.                 Expected Discharge Date and Time       Expected Discharge Date Expected Discharge Time    Aug 1, 2024               Earnest Oconnor RN

## 2024-08-01 NOTE — CASE MANAGEMENT/SOCIAL WORK
Continued Stay Note  [facility]     Patient Name: Anshu Engel  MRN: [id number]  Today's Date: 8/1/2024    Admit Date: 7/27/2024    Plan: Home with [facility]   Discharge Plan       Row Name 08/01/24 1414       Plan    Plan Home with [facility]    Patient/Family in Agreement with Plan yes    Plan Comments Plan is home with [facility]. Patient will need a LYFT for transport. Rubens from [facility] will deliver a portable oxygen tank to patient room. CM will need to setup [employer] for transport at discharge. Trilogy will be delivered by [facility] to patient's home at discharge. CM will continue to follow.    Final Discharge Disposition Code 06 - home with home health care    Final Note --      Row Name 08/01/24 1318       Plan    Plan --    Patient/Family in Agreement with Plan --    Plan Comments --    Final Discharge Disposition Code --                   Discharge Codes    No documentation.                 Expected Discharge Date and Time       Expected Discharge Date Expected Discharge Time    Aug 1, 2024               Earnest Oconnor RN

## 2024-08-01 NOTE — PLAN OF CARE
"Goal Outcome Evaluation:  Plan of Care Reviewed With: patient        Progress: no change  Outcome Evaluation: Pt seen at 1105; some work of breathing at rest observed, pt reported he had just sat up to side of bed, observed breathing eased with rest during encounter. Observed small skin sore on bridge of nose; pt stated that he did not know whether it was there yesterday, but that it is sore and hurts when wearing NIPPV; also stated that \"air blows all over my face\" when wearing NIPPV; chat message sent to [facility] for fit assessment. Pt plan for discharge home with  and Trilogy; provided [facility] information yesterday.    0930 Palliative IDT meeting:  NANCY PIÑA, RN, SW,   After hours, weekends and holidays, contact Palliative Provider by calling [phone]       Problem: Palliative Care  Goal: Enhanced Quality of Life  Outcome: Ongoing, Progressing  Intervention: Promote Advance Care Planning  Flowsheets (Taken 8/1/2024 1451)  Life Transition/Adjustment: ([facility] information provided to pt by LAINE Phillips RN yesterday) other (see comments)  Intervention: Optimize Function  Flowsheets (Taken 8/1/2024 1451)  Fatigue Management:   frequent rest breaks encouraged   paced activity encouraged  Sleep/Rest Enhancement: consistent schedule promoted  Intervention: Optimize Psychosocial Wellbeing  Flowsheets (Taken 8/1/2024 1451)  Supportive Measures:   active listening utilized   positive reinforcement provided   self-reflection promoted   self-responsibility promoted   verbalization of feelings encouraged       "

## 2024-08-01 NOTE — CASE MANAGEMENT/SOCIAL WORK
Case Management Discharge Note      Final Note: Plan is home with [facility]. Patient will need a LYFT for transport. Rubens from [facility] will deliver a portable oxygen tank to patient room. Call Samuel at [phone] or Brianda at [phone] to arrange a LYFT when transport is at the room to arrange for the LYFT. [facility] this [facility].         Selected Continued Care - Admitted Since 7/27/2024       Destination    No services have been selected for the patient.                Durable Medical Equipment Coordination complete.      Service Provider Selected Services Address Phone Fax Patient Preferred    [facility] Durable Medical Equipment ,  Oxygen Equipment and Accessories [address] [id number] [phone] --              Dialysis/Infusion    No services have been selected for the patient.                Home Medical Care    No services have been selected for the patient.                Therapy    No services have been selected for the patient.                Community Resources    No services have been selected for the patient.                Community & DME    No services have been selected for the patient.                         Final Discharge Disposition Code: 06 - home with home health care

## 2024-08-01 NOTE — PROGRESS NOTES
[facility] Medicine Services  PROGRESS NOTE    Patient Name: Anshu Engel  : 1960  MRN: [id number]    Date of Admission: 2024  Primary Care Physician: Provider, No Known    Subjective   Subjective     CC:  Confusion, hypercapnea, transfer from [facility]    HPI:   Feels okay.  Anxious about going home.  Not forthcoming about whether he thinks he is at his baseline.  He did walk 80 feet with PT recently.  He is still not interested in going to inpt rehab.       Objective   Objective     Vital Signs:   Temp:  [98.1 °F (36.7 °C)-99.3 °F (37.4 °C)] 98.7 °F (37.1 °C)  Heart Rate:  [] 113  Resp:  [16-18] 18  BP: (111-148)/(60-89) 140/82  Flow (L/min):  [3] 3     Physical Exam:  Constitutional: Chronically ill appearing, alert and NAD, breathing not labored at rest. Got more anxious during discussion about DC planning.    HENT: NCAT,   Cardiovascular: tachycardic 110   Pulm - generally decreased, nonfocal, no wheeze  Gastrointestinal: Soft, nontender, nondistended    Results Reviewed:  LAB RESULTS:      Lab 24  1855 24  0724  0709 24  1059   WBC 9.57  --  7.52 8.08 8.99   HEMOGLOBIN 10.1*  --  10.1* 10.0* 10.2*   HEMATOCRIT 30.9*  --  31.1* 29.3* 30.7*   PLATELETS 292  --  280 271 244   NEUTROS ABS  --   --   --   --  7.85*   IMMATURE GRANS (ABS)  --   --   --   --  0.04   LYMPHS ABS  --   --   --   --  0.40*   MONOS ABS  --   --   --   --  0.67   EOS ABS  --   --   --   --  0.02   MCV 91.7  --  91.7 88.0 90.8   PROCALCITONIN  --   --   --   --  0.05   D DIMER QUANT  --  0.31  --   --   --          Lab 24  0438 24  0705 24  1906 24  0709 24  1059   SODIUM 139 138  --  137 136   POTASSIUM 3.7 3.9 4.1 3.4* 3.5   CHLORIDE 91* 96*  --  93* 94*   CO2 42.0* 39.0*  --  38.0* 39.0*   ANION GAP 6.0 3.0*  --  6.0 3.0*   BUN 9 14  --  9 9   CREATININE 0.49* 0.56*  --  0.57* 0.49*   EGFR 116.0 111.4  --  110.8  116.0   GLUCOSE 92 132*  --  99 126*   CALCIUM 8.6 8.9  --  8.8 8.5*   HEMOGLOBIN A1C  --   --   --  4.50*  --    TSH  --   --   --  0.557  --          Lab 07/27/24  1059   TOTAL PROTEIN 5.4*   ALBUMIN 3.7   GLOBULIN 1.7   ALT (SGPT) 9   AST (SGOT) 13   BILIRUBIN 0.3   ALK PHOS 53             Lab 07/28/24  0709   CHOLESTEROL 177   LDL CHOL 109*   HDL CHOL 50   TRIGLYCERIDES 99             Lab 07/28/24  0259 07/27/24  1321 07/27/24  1059   PH, ARTERIAL 7.420 7.296* 7.297*   PCO2, ARTERIAL 59.0* 77.8* 76.1*   PO2 ART 80.6* 94.5 80.0*   FIO2 28 28 28   HCO3 ART 38.3* 37.9* 37.2*   BASE EXCESS ART 11.9* 9.1* 8.6*   CARBOXYHEMOGLOBIN 1.4 1.4 1.4     Brief Urine Lab Results       None            Microbiology Results Abnormal       None            No radiology results from the last 24 hrs        Current medications:  Scheduled Meds:clonazePAM, 0.5 mg, Oral, BID  heparin (porcine), 5,000 Units, Subcutaneous, Q8H  ipratropium-albuterol, 3 mL, Nebulization, 4x Daily - RT  lisinopril, 10 mg, Oral, Daily  megestrol, 40 mg, Oral, Daily  montelukast, 10 mg, Oral, Nightly  predniSONE, 40 mg, Oral, Daily With Breakfast  sodium chloride, 10 mL, Intravenous, Q12H  vilazodone, 20 mg, Oral, Daily      Continuous Infusions:   PRN Meds:.  acetaminophen **OR** acetaminophen **OR** acetaminophen    albuterol    senna-docusate sodium **AND** polyethylene glycol **AND** bisacodyl **AND** bisacodyl    Calcium Replacement - Follow Nurse / BPA Driven Protocol    lactulose    Magnesium Standard Dose Replacement - Follow Nurse / BPA Driven Protocol    ondansetron ODT **OR** ondansetron    Phosphorus Replacement - Follow Nurse / BPA Driven Protocol    Potassium Replacement - Follow Nurse / BPA Driven Protocol    sodium chloride    sodium chloride    Assessment & Plan   Assessment & Plan     Active Hospital Problems    Diagnosis  POA    **COPD with acute exacerbation [J44.1]  Yes    HTN (hypertension) [I10]  Unknown    HLD (hyperlipidemia) [E78.5]   Unknown    Lung nodule [R91.1]  Unknown    Acute on chronic respiratory failure with hypercapnia [J96.22]  Unknown    Respiratory acidosis [E87.29]  Unknown    Anemia [D64.9]  Unknown    Acute-on-chronic respiratory failure [J96.20]  Yes      Resolved Hospital Problems   No resolved problems to display.        Brief Hospital Course to date:  Anshu Engel is a 64 y.o. male w COPD on chronic 2 liters n/c, h/o granulomatous lung nodules, anxiety previously on benzos.  Was admitted to [facility]/dyspnea.  After 4 days, transferred to [facility]     Patient has a history of chronic hypoxic respiratory failure due to COPD.  He is currently being treated for COPD exacerbation.  He has tried and failed bilevel therapy.  Due to his most recent ABG revealing a PaCO2 of 59, he is at risk for worsening chronic respiratory failure.  Furthermore, the patientrequires continuous alarms, backup battery and portability which are not possible with Bilevel/Rad devices.  I am prescribing non-invasive positive pressure ventilation therapy to decrease the chance of continued unplanned expensive medical encounters and worsening of the patient's condition.    COPD exacerbation  acute on chronic hypercapneic/hypoxic resp failure   Suspected benzo withdrawal contributing to anxiety   - Patient's clonazepam was prescribed 0.5 mg BID prescribed 6/7 x #30 days per PDMP. UDS negative on admission to [facility] so very likely some withdrawal. Per fiance, stopped benzo abruptly on 7/21 by patient decision  - Trilogy to be delivered to his home at [place]  - form signed.   -f/u OP w [facility]-pulm closely after discharge  -complete prednisone/doxy    Sinus tachycardia to 120's  -TSH wnl.  No improvement w fluids.  D-dimer wnl   - suspect benzo withdrawal and albuterol effect  - , not on home BB  - consider ECHO if persistent    Severe malnutrition - nutrition consultation pending  Significant anxiety - agitation at OSH requiring  geodon, home trintellix for now  Acute constipation - improved  Former smoker - per alvin, quit x 6 months ago  HTN - home lisinopril    Expected Discharge Location and Transportation:  home w HH/PT    Expected Discharge 8/1 (Discharge date is tentative pending patient's medical condition and is subject to change)  Expected Discharge Date: 8/1/2024; Expected Discharge Time:      VTE Prophylaxis:  Pharmacologic VTE prophylaxis orders are present.         AM-PAC 6 Clicks Score (PT): 20 (08/01/24 0800)    CODE STATUS:   Code Status and Medical Interventions: No CPR (Do Not Attempt to Resuscitate); Limited Support; No intubation (DNI), No vasopressors, No dialysis, No cardioversion, No artificial nutrition   Ordered at: 07/27/24 1210     Medical Intervention Limits:    No intubation (DNI)    No vasopressors    No dialysis    No cardioversion    No artificial nutrition     Level Of Support Discussed With:    Health Care Surrogate     Code Status (Patient has no pulse and is not breathing):    No CPR (Do Not Attempt to Resuscitate)     Medical Interventions (Patient has pulse or is breathing):    Limited Support       Kristine Ramirez MD  08/01/24

## 2024-08-01 NOTE — CASE MANAGEMENT/SOCIAL WORK
Continued Stay Note  [facility]     Patient Name: Anshu Engel  MRN: [id number]  Today's Date: 8/1/2024    Admit Date: 7/27/2024    Plan: Home with [facility]   Discharge Plan       Row Name 08/01/24 1318       Plan    Plan Home with [facility]    Patient/Family in Agreement with Plan yes    Plan Comments     Final Discharge Disposition Code 06 - home with home health care      Row Name 08/01/24 1045       Plan    Plan Home with [facility]    Patient/Family in Agreement with Plan yes    Plan Comments Spoke to Rubens at [facility] and patient's Trlogy has been approved and SO requested that [facility] supply patient's home oxygen also. Spoke with Lolis by phone and she is in agreement with the plan and notified about the Trilogy and home oxygen. Plan is home with [facility]. CM will continue to follow.    Final Discharge Disposition Code 06 - home with home health care                   Discharge Codes    No documentation.                 Expected Discharge Date and Time       Expected Discharge Date Expected Discharge Time    Aug 1, 2024               Earnest Oconnor RN

## 2024-08-02 ENCOUNTER — READMISSION MANAGEMENT (OUTPATIENT)
Dept: CALL CENTER | Facility: HOSPITAL | Age: 64
End: 2024-08-02
Payer: MEDICARE

## 2024-08-02 VITALS
SYSTOLIC BLOOD PRESSURE: 120 MMHG | RESPIRATION RATE: 18 BRPM | HEART RATE: 111 BPM | OXYGEN SATURATION: 97 % | DIASTOLIC BLOOD PRESSURE: 69 MMHG | TEMPERATURE: 99 F

## 2024-08-02 PROBLEM — E43 SEVERE MALNUTRITION: Status: ACTIVE | Noted: 2024-08-02

## 2024-08-02 PROCEDURE — 25010000002 HEPARIN (PORCINE) PER 1000 UNITS: Performed by: INTERNAL MEDICINE

## 2024-08-02 PROCEDURE — 99239 HOSP IP/OBS DSCHRG MGMT >30: CPT | Performed by: INTERNAL MEDICINE

## 2024-08-02 PROCEDURE — 94664 DEMO&/EVAL PT USE INHALER: CPT

## 2024-08-02 PROCEDURE — 94799 UNLISTED PULMONARY SVC/PX: CPT

## 2024-08-02 PROCEDURE — 63710000001 PREDNISONE PER 1 MG: Performed by: INTERNAL MEDICINE

## 2024-08-02 PROCEDURE — 94761 N-INVAS EAR/PLS OXIMETRY MLT: CPT

## 2024-08-02 RX ORDER — CLONAZEPAM 0.5 MG/1
0.5 TABLET ORAL 2 TIMES DAILY PRN
Qty: 14 TABLET | Refills: 0 | Status: SHIPPED | OUTPATIENT
Start: 2024-08-02

## 2024-08-02 RX ORDER — PREDNISONE 10 MG/1
20 TABLET ORAL
Qty: 10 TABLET | Refills: 0 | Status: SHIPPED | OUTPATIENT
Start: 2024-08-02 | End: 2024-08-07

## 2024-08-02 RX ORDER — LEVALBUTEROL INHALATION SOLUTION 0.63 MG/3ML
1 SOLUTION RESPIRATORY (INHALATION) EVERY 4 HOURS PRN
Qty: 120 EACH | Refills: 12 | Status: SHIPPED | OUTPATIENT
Start: 2024-08-02

## 2024-08-02 RX ADMIN — Medication 10 ML: at 09:03

## 2024-08-02 RX ADMIN — LISINOPRIL 10 MG: 10 TABLET ORAL at 08:58

## 2024-08-02 RX ADMIN — HEPARIN SODIUM 5000 UNITS: 5000 INJECTION INTRAVENOUS; SUBCUTANEOUS at 05:47

## 2024-08-02 RX ADMIN — IPRATROPIUM BROMIDE AND ALBUTEROL SULFATE 3 ML: 2.5; .5 SOLUTION RESPIRATORY (INHALATION) at 12:39

## 2024-08-02 RX ADMIN — CLONAZEPAM 0.5 MG: 0.5 TABLET ORAL at 08:58

## 2024-08-02 RX ADMIN — VILAZODONE HYDROCHLORIDE 20 MG: 40 TABLET, FILM COATED ORAL at 08:58

## 2024-08-02 RX ADMIN — MEGESTROL ACETATE 40 MG: 20 TABLET ORAL at 08:59

## 2024-08-02 RX ADMIN — IPRATROPIUM BROMIDE AND ALBUTEROL SULFATE 3 ML: 2.5; .5 SOLUTION RESPIRATORY (INHALATION) at 09:02

## 2024-08-02 RX ADMIN — PREDNISONE 20 MG: 20 TABLET ORAL at 08:58

## 2024-08-02 RX ADMIN — IPRATROPIUM BROMIDE AND ALBUTEROL SULFATE 3 ML: 2.5; .5 SOLUTION RESPIRATORY (INHALATION) at 15:22

## 2024-08-02 NOTE — DISCHARGE SUMMARY
"    [facility] Medicine Services  DISCHARGE SUMMARY    Patient Name: Anshu Engel  : 1960  MRN: [id number]    Date of Admission: 2024 10:18 AM  Date of Discharge:  2024  Primary Care Physician: Provider, No Known  Dank Borja     Consults       Date and Time Order Name Status Description    2024 12:10 PM Inpatient Palliative Care MD Consult Completed     2024 12:10 PM Inpatient Pulmonology Consult Completed             Hospital Course     Presenting Problem: dyspnea     Active Hospital Problems    Diagnosis  POA    **COPD with acute exacerbation [J44.1]  Yes    Severe malnutrition [E43]  Yes    HTN (hypertension) [I10]  Unknown    HLD (hyperlipidemia) [E78.5]  Unknown    Lung nodule [R91.1]  Unknown    Acute on chronic respiratory failure with hypercapnia [J96.22]  Unknown    Respiratory acidosis [E87.29]  Unknown    Anemia [D64.9]  Unknown    Acute-on-chronic respiratory failure [J96.20]  Yes      Resolved Hospital Problems   No resolved problems to display.          Hospital Course:  Anshu Engel is a 64 y.o. male who lives at home and has PMH of HTN, HLD, known (non- cancerous) lung nodules (previously followed by Dr. Heck at ), and COPD on 2L NC.  He has had progressive dyspnea for \"a while\", until his fiancee brought him to [facility] on 24.  He had pCO2 over 100 and was placed on BIPAP.  His fiancee asked for transfer to [facility] due to personal preference.     Patient has a history of chronic hypoxic respiratory failure due to COPD.  He is currently being treated for COPD exacerbation.  He has tried and failed bilevel therapy.  Due to his most recent ABG revealing a PaCO2 of 59, he is at risk for worsening chronic respiratory failure.  Furthermore, the patientrequires continuous alarms, backup battery and portability which are not possible with Bilevel/Rad devices.  I am prescribing non-invasive positive pressure ventilation " therapy to decrease the chance of continued unplanned expensive medical encounters and worsening of the patient's condition.     COPD exacerbation  acute on chronic hypercapneic/hypoxic resp failure   Suspected benzo withdrawal contributing to anxiety   - He was treated with steroids and nebulizer therapy and NIPPV.    - He improved to his apparent baseline.   - Anxiety was significant  - Trilogy machine was approved and will be delivered to his home.    - Xopenex is refilled at [facility] pharmacy  - PCP appointment was made for him.    - Ban was involved in his care.     Anxiety, benzo use  - Psychiatrist prescribes clonazepam 0.5 mg BID ; last filled 6/7 x #30 days per PDMP.   - UDS negative on admission to [place] so very likely some withdrawal.  -  Per alvin, he stopped benzo abruptly on 7/21   - Clonazepine 0/5 BID was tolerated in house.  #14 tabs were prescribed at [facility].    - Palliative care team followed him      Sinus tachycardia to 120's  -TSH wnl. This is likely due to COPD and albuterol treatments      Severe malnutrition -   Acute constipation - improved  Former smoker - per alvin, quit x 6 months ago  HTN - home lisinopril     Discharge Follow Up Recommendations for outpatient labs/diagnostics:   -He was assigned a PCP Dank Borja, Aug 13 at 11:00 .      - PCP may consider referring him back to Dr Heck,  Pulm, for his advanced COPD.      -- FU with psychiatrist as scheduled.    _ FU with [facility], arranged.      Day of Discharge     HPI: Feels about the same.  He is amenable to going home. He walked 60 feet with PT recently.  His GF has endorsed that she will be at home today when he arrives by shuttle.  PCP has been arranged.       Palliative staff is in the room when I enter, talking to pt about the use of clonazepma for anxiety.     Review of Systems  Remains anxious but repeats that he isn't sure he likes taking meds for it.     Vital Signs:   Flow (L/min):  [3]  3      Physical Exam:  Gen:  appears same as yesterday - alert, conversant but says few words, seems to have trouble expressing his thoughts which has been true throughout his stay..  Breathing minimally labored.   Neuro: alert and oriented, clear speech, follows commands, grossly nonfocal  HEENT:  NC/AT   Neck:  Supple, no LAD  Heart tachy RR, ST per tele   Lungs decrreased throughout.  No wheeze.  No cough.   Abd:  Soft, nontender, no rebound or guarding, pos BS  Extrem:  No c/c/e      Pertinent  and/or Most Recent Results     LAB RESULTS:      Lab 07/30/24  0438 07/29/24  1855 07/29/24  0705 07/28/24  0709   WBC 9.57  --  7.52 8.08   HEMOGLOBIN 10.1*  --  10.1* 10.0*   HEMATOCRIT 30.9*  --  31.1* 29.3*   PLATELETS 292  --  280 271   MCV 91.7  --  91.7 88.0   D DIMER QUANT  --  0.31  --   --          Lab 07/30/24  0438 07/29/24  0705 07/28/24  1906 07/28/24  0709   SODIUM 139 138  --  137   POTASSIUM 3.7 3.9 4.1 3.4*   CHLORIDE 91* 96*  --  93*   CO2 42.0* 39.0*  --  38.0*   ANION GAP 6.0 3.0*  --  6.0   BUN 9 14  --  9   CREATININE 0.49* 0.56*  --  0.57*   EGFR 116.0 111.4  --  110.8   GLUCOSE 92 132*  --  99   CALCIUM 8.6 8.9  --  8.8   HEMOGLOBIN A1C  --   --   --  4.50*   TSH  --   --   --  0.557                   Lab 07/28/24  0709   CHOLESTEROL 177   LDL CHOL 109*   HDL CHOL 50   TRIGLYCERIDES 99             Lab 07/28/24  0259   PH, ARTERIAL 7.420   PCO2, ARTERIAL 59.0*   PO2 ART 80.6*   FIO2 28   HCO3 ART 38.3*   BASE EXCESS ART 11.9*   CARBOXYHEMOGLOBIN 1.4     Brief Urine Lab Results       None          Microbiology Results (last 10 days)       ** No results found for the last 240 hours. **            XR Chest 1 View    Result Date: 7/27/2024  XR CHEST 1 VW Date of Exam: 7/27/2024 10:59 AM EDT Indication: hypoxia Comparison: None available. Findings: Unremarkable cardiomediastinal silhouette. There are chronic parenchymal lung changes. No focal airspace consolidation, pleural effusion, or pneumothorax.  Healed right posterior rib fractures. No acute osseous abnormality.     Impression: Chronic findings as above. No acute cardiopulmonary abnormality. Electronically Signed: Daniel Pabon MD  7/27/2024 11:14 AM EDT  Workstation ID: [id number]       Discharge Details        Discharge Medications        New Medications        Instructions Start Date   predniSONE 10 MG tablet  Commonly known as: DELTASONE   20 mg, Oral, Daily With Breakfast, Then 1 tab daily for 3 doses.  Then stop.             Continue These Medications        Instructions Start Date   clonazePAM 0.5 MG tablet  Commonly known as: KlonoPIN   0.5 mg, Oral, 2 Times Daily PRN      levalbuterol 0.63 MG/3ML nebulizer solution  Commonly known as: XOPENEX   0.63 mg, Nebulization, Every 4 Hours PRN      levalbuterol 45 MCG/ACT inhaler  Commonly known as: XOPENEX HFA   1-2 puffs, Inhalation, Every 4 Hours PRN      lisinopril 10 MG tablet  Commonly known as: PRINIVIL,ZESTRIL   10 mg, Oral, Daily      megestrol 40 MG tablet  Commonly known as: MEGACE   40 mg, Oral, Daily      MiraLax 17 g packet  Generic drug: polyethylene glycol   17 g, Oral, Daily      montelukast 10 MG tablet  Commonly known as: SINGULAIR   10 mg, Oral, Nightly      tiotropium 18 MCG per inhalation capsule  Commonly known as: SPIRIVA   1 capsule, Inhalation, Daily - RT      vilazodone 20 MG tablet tablet  Commonly known as: VIIBRYD   20 mg, Oral, Daily               Allergies   Allergen Reactions    Penicillins Rash         Discharge Disposition:  Home or Self Care    Diet:  Hospital:  No active diet order      Diet Instructions    Regular diet            Activity:      Restrictions or Other Recommendations:  Use Trilogy QHS and prn        CODE STATUS:    Code Status and Medical Interventions: No CPR (Do Not Attempt to Resuscitate); Limited Support; No intubation (DNI), No vasopressors, No dialysis, No cardioversion, No artificial nutrition   Ordered at: 07/27/24 1210     Medical Intervention  Limits:    No intubation (DNI)    No vasopressors    No dialysis    No cardioversion    No artificial nutrition     Level Of Support Discussed With:    Health Care Surrogate     Code Status (Patient has no pulse and is not breathing):    No CPR (Do Not Attempt to Resuscitate)     Medical Interventions (Patient has pulse or is breathing):    Limited Support       Future Appointments   Date Time Provider Department Center   8/13/2024 11:00 AM Dank Borja MD [facility]       Additional Instructions for the Follow-ups that You Need to Schedule       Ambulatory Referral to Home Health   As directed      Face to Face Visit Date: 7/31/2024   Follow-up provider for Plan of Care?: I treated the patient in an acute care facility and will not continue treatment after discharge.   Follow-up provider: SHAHID GORDILLO [[id number]]   Reason/Clinical Findings: COPD   Describe mobility limitations that make leaving home difficult: Impaired mobility   Nursing/Therapeutic Services Requested: Occupational Therapy Physical Therapy Skilled Nursing   Skilled nursing orders: O2 instruction COPD management   Frequency: 1 Week 1        Discharge Follow-up with PCP   As directed       Currently Documented PCP:    Provider, No Known    PCP Phone Number:    None     Follow Up Details: 2 weeks                      Kristine Ramirez MD  08/03/24      Time Spent on Discharge:  I spent  50  minutes on this discharge activity which included: face-to-face encounter with the patient, reviewing the data in the system, coordination of the care with the nursing staff as well as consultants, documentation, and entering orders.

## 2024-08-02 NOTE — CASE MANAGEMENT/SOCIAL WORK
Continued Stay Note  [facility]     Patient Name: Anshu Engel  MRN: [id number]  Today's Date: 8/2/2024    Admit Date: 7/27/2024    Plan: Home with [facility]   Discharge Plan       Row Name 08/02/24 0937       Plan    Plan Home with [facility]    Patient/Family in Agreement with Plan yes    Plan Comments Spoke with patient at bedside. Plan is home with [facility]. Patient will need a [employer] van for transport. Rubens from [facility] will delivered a portable oxygen tank to patient room. CM will need to setup [employer] for transport at discharge. Trilogy will be delivered by [facility] to patient's home at discharge. CM will continue to follow.    Final Discharge Disposition Code 06 - home with home health care                   Discharge Codes    No documentation.                 Expected Discharge Date and Time       Expected Discharge Date Expected Discharge Time    Aug 1, 2024               Earnest Oconnor RN

## 2024-08-02 NOTE — PLAN OF CARE
Goal Outcome Evaluation:                                              Problem: Adult Inpatient Plan of Care  Goal: Absence of Hospital-Acquired Illness or Injury  Intervention: Identify and Manage Fall Risk  Recent Flowsheet Documentation  Taken 8/2/2024 0356 by Andre Lux RN  Safety Promotion/Fall Prevention:   activity supervised   safety round/check completed   toileting scheduled  Taken 8/2/2024 0200 by Andre Lux RN  Safety Promotion/Fall Prevention:   activity supervised   safety round/check completed   toileting scheduled  Taken 8/1/2024 2000 by Andre Lux RN  Safety Promotion/Fall Prevention:   activity supervised   safety round/check completed   toileting scheduled  Intervention: Prevent Skin Injury  Recent Flowsheet Documentation  Taken 8/2/2024 0356 by Andre Lux RN  Body Position: position changed independently  Skin Protection: adhesive use limited  Taken 8/2/2024 0200 by Andre Lux RN  Body Position: position changed independently  Skin Protection: adhesive use limited  Taken 8/2/2024 0000 by Andre Lux RN  Body Position: position changed independently  Skin Protection: adhesive use limited  Taken 8/1/2024 2200 by Ander Lux RN  Body Position: position changed independently  Skin Protection: adhesive use limited  Taken 8/1/2024 2000 by Andre Lux RN  Body Position: supine  Skin Protection: adhesive use limited  Intervention: Prevent and Manage VTE (Venous Thromboembolism) Risk  Recent Flowsheet Documentation  Taken 8/2/2024 0356 by Andre Lux RN  VTE Prevention/Management:   bilateral   sequential compression devices off  Taken 8/2/2024 0200 by Andre Lux RN  VTE Prevention/Management:   bilateral   sequential compression devices off  Taken 8/2/2024 0000 by Andre Lux RN  VTE Prevention/Management:   bilateral   sequential compression devices off  Taken 8/1/2024 2200 by Andre Lux RN  VTE Prevention/Management:   bilateral    sequential compression devices off  Taken 8/1/2024 2000 by Andre Lux RN  VTE Prevention/Management:   bilateral   sequential compression devices off  Intervention: Prevent Infection  Recent Flowsheet Documentation  Taken 8/2/2024 0356 by Andre Lux RN  Infection Prevention: environmental surveillance performed  Taken 8/2/2024 0200 by Andre Lux RN  Infection Prevention: environmental surveillance performed  Taken 8/2/2024 0000 by Andre Lux RN  Infection Prevention: environmental surveillance performed  Taken 8/1/2024 2200 by Andre Lux RN  Infection Prevention: environmental surveillance performed  Taken 8/1/2024 2000 by Andre Lux RN  Infection Prevention: environmental surveillance performed  Goal: Optimal Comfort and Wellbeing  Intervention: Monitor Pain and Promote Comfort  Recent Flowsheet Documentation  Taken 8/2/2024 0356 by Andre Lux RN  Pain Management Interventions: quiet environment facilitated  Taken 8/2/2024 0200 by Andre Lux RN  Pain Management Interventions: quiet environment facilitated  Taken 8/2/2024 0000 by Andre Lux RN  Pain Management Interventions: quiet environment facilitated  Taken 8/1/2024 2200 by Andre Lux RN  Pain Management Interventions: quiet environment facilitated  Taken 8/1/2024 2000 by Andre Lux RN  Pain Management Interventions: quiet environment facilitated  Intervention: Provide Person-Centered Care  Recent Flowsheet Documentation  Taken 8/2/2024 0356 by Andre Lux RN  Trust Relationship/Rapport: care explained  Taken 8/2/2024 0200 by Andre Lux RN  Trust Relationship/Rapport: care explained  Taken 8/2/2024 0000 by Andre Lux RN  Trust Relationship/Rapport: care explained  Taken 8/1/2024 2200 by Andre Lux RN  Trust Relationship/Rapport: care explained  Taken 8/1/2024 2000 by Andre Lux RN  Trust Relationship/Rapport: care explained     VSS, voids well, rested throughout the  night, 2L NC, will continue to monitor for changes.

## 2024-08-02 NOTE — PROGRESS NOTES
Clinical Nutrition     Patient Name: Anshu Engel  YOB: 1960  MRN: [id number]  Date of Encounter: 08/02/24 14:07 EDT  Admission date: 7/27/2024  Reason for Visit: Follow-up protocol    Assessment   Nutrition Assessment   Admission Diagnosis:  COPD with acute exacerbation [J44.1]    Problem List:    COPD with acute exacerbation    HTN (hypertension)    HLD (hyperlipidemia)    Lung nodule    Acute on chronic respiratory failure with hypercapnia    Respiratory acidosis    Anemia    Acute-on-chronic respiratory failure      PMH:   He  has a past medical history of Allergic and Elevated cholesterol.    PSH:  He  has a past surgical history that includes Ankle surgery (Left).    Substance history: tobacco, etoh remote    Applicable Nutrition History:     SKIN: R ischium/ trochanter area healed wound per WOC, L arm tear    Labs    Labs Reviewed: Yes    Results from last 7 days   Lab Units 07/30/24  0438 07/29/24  0705 07/28/24  1906 07/28/24  0709 07/27/24  1059   GLUCOSE mg/dL 92 132*  --  99 126*   BUN mg/dL 9 14  --  9 9   CREATININE mg/dL 0.49* 0.56*  --  0.57* 0.49*   SODIUM mmol/L 139 138  --  137 136   CHLORIDE mmol/L 91* 96*  --  93* 94*   POTASSIUM mmol/L 3.7 3.9 4.1 3.4* 3.5   ALT (SGPT) U/L  --   --   --   --  9       Results from last 7 days   Lab Units 07/28/24  0709 07/27/24  1059   ALBUMIN g/dL  --  3.7   CHOLESTEROL mg/dL 177  --    TRIGLYCERIDES mg/dL 99  --            Lab Results   Lab Value Date/Time    HGBA1C 4.50 (L) 07/28/2024 0709               Medications    Medications Reviewed: yes    Scheduled Meds:clonazePAM, 0.5 mg, Oral, BID  heparin (porcine), 5,000 Units, Subcutaneous, Q8H  ipratropium-albuterol, 3 mL, Nebulization, 4x Daily - RT  lisinopril, 10 mg, Oral, Daily  megestrol, 40 mg, Oral, Daily  montelukast, 10 mg, Oral, Nightly  predniSONE, 20 mg, Oral, Daily With Breakfast  sodium chloride, 10 mL, Intravenous, Q12H  vilazodone, 20 mg, Oral,  Daily      Continuous Infusions:   PRN Meds:.  acetaminophen **OR** acetaminophen **OR** acetaminophen    albuterol    senna-docusate sodium **AND** polyethylene glycol **AND** bisacodyl **AND** bisacodyl    Calcium Replacement - Follow Nurse / BPA Driven Protocol    lactulose    Magnesium Standard Dose Replacement - Follow Nurse / BPA Driven Protocol    ondansetron ODT **OR** ondansetron    Phosphorus Replacement - Follow Nurse / BPA Driven Protocol    Potassium Replacement - Follow Nurse / BPA Driven Protocol    sodium chloride    sodium chloride    Intake/Ouptut 24 hrs (0701 - 0700)   I&O's Reviewed: yes    Intake & Output (last day)         08/01 0701 08/02 0700 08/02 0701 08/03 0700    P.O. 880 240    Total Intake 880 240    Urine 2475 900    Stool      Total Output 2475 900    Net -[phone] -892                 Anthropometrics     Height:  71in  Last Filed Weight:  137lb  Method:  bedscale  BMI:  19.1    UBW: pt reports his UBW used to be ~165lb,  unclear how long ago pt actually weighed this    Weight change:  pt reports he had been losing wt 2nd etoh use, was done to 121lb's at one point, has gained some wt since starting megace, ? 16lb wt gain    Per EMR: pt weighed 145lb 2-1-22 at [facility]    Per EMR: pt weighed 148lb 10-22-21 at [facility]    Nutrition Focused Physical Exam     Date: 7-30-24    Patient meets criteria for malnutrition diagnosis, see MSA note.     Subjective   Reported/Observed/Food/Nutrition Related History:       8-2: pt sitting up in bed, on nasal cannula, reports good appetite    7-30:Pt sitting up in chair, on nasal cannula, has tremors, observed he has eaten most of his dinner    Pt reports he used to weigh ~ 165lb's, has been losing wt over the years 2nd his etoh use, has not had any etoh for 2 years now, just didn't want to eat, had gotten down to 121lb's at one point, has gained some wt since started taking megace, typically eats 2 meals per day at home, is drinking the boost, but it makes him  feel bloated    Per RN: pt swallowing ok    Current Nutrition Prescription     PO: Diet: Regular/House; Fluid Consistency: Thin (IDDSI 0)  Oral Nutrition Supplement: Ensure Clear 3x/day  Intake:  86% of 9 meals    Assessment & Plan   Nutrition Diagnosis   Date: 7-30-24 Updated: 8-2  Problem Malnutrition     Etiology Etoh abuse/COPD   Signs/Symptoms Po intake < 75%, severe muscle wasting, fat loss   Status:  improving    Goal:   Nutrition to support treatment and Increase intake      Nutrition Intervention      Follow treatment progress, Care plan reviewed, Supplement provided    Encourage good source of protein with all meals and snacks    Monitoring/Evaluation:   Per protocol, I&O, PO intake, Supplement intake, Pertinent labs, Weight, Skin status, GI status, Symptoms, Swallow function    Ofelia Lyles RD  Time Spent:30min

## 2024-08-02 NOTE — PLAN OF CARE
Goal Outcome Evaluation:  Plan of Care Reviewed With: patient        Progress: improving  Outcome Evaluation: Pt seen at 1255; sitting up in bed eating lunch; O2nc noted to be out of nares, sitting on pt's chest, with Sat 75%; pt stated he was unaware it had come off, and Sat recovered to 93% when replaced. Dr. Ramirez joined the room, discussed discharge home with pt; pt stated he would like to go home. Arrangements in place for Trilogy at home. Reminded pt of prior information provided for [facility], encouraged to call them from home; requested Dr. Ramirez to note in discharge summary for communication with PCP (CM arranging new PCP as pt is not current with one). Plan for discharge home this afternoon.    0930 Palliative IDT meeting:  MD, NANCY, RN  After hours, weekends and holidays, contact Palliative Provider by calling [phone]       Problem: Palliative Care  Goal: Enhanced Quality of Life  Outcome: Ongoing, Progressing  Intervention: Optimize Function  Flowsheets (Taken 8/2/2024 1537)  Sensory Stimulation Regulation: television on  Fatigue Management:   frequent rest breaks encouraged   paced activity encouraged  Sleep/Rest Enhancement: consistent schedule promoted  Intervention: Optimize Psychosocial Wellbeing  Flowsheets (Taken 8/2/2024 1537)  Supportive Measures:   active listening utilized   decision-making supported   positive reinforcement provided   relaxation techniques promoted   self-care encouraged   self-reflection promoted   self-responsibility promoted   verbalization of feelings encouraged

## 2024-08-02 NOTE — OUTREACH NOTE
Prep Survey      Flowsheet Row Responses   [facility] patient discharged from? [place]   Is LACE score < 7 ? No   Eligibility [facility]   Date of Admission 07/27/24   Date of Discharge 08/02/24   Discharge Disposition Home-Health Care [facility]   Discharge diagnosis COPD with acute exacerbation   Does the patient have one of the following disease processes/diagnoses(primary or secondary)? COPD   Does the patient have Home health ordered? Yes   What is the Home health agency?  [facility]    Is there a DME ordered? Yes   What DME was ordered? triology provided by [facility]   Comments regarding appointments new PCP appt   Prep survey completed? Yes            Danisha AGGARWAL - Registered Nurse

## 2024-08-02 NOTE — CASE MANAGEMENT/SOCIAL WORK
Case Management Discharge Note      Final Note: Plan is home with [facility]  SN/PT/OT. [employer]  van is scheduled for 15:30 to take the patient home. Home oxygen and Trilogy provided by [facility]. New PCP was arranged with Dr. Borja in [place]. No other discharge needs identified.         Selected Continued Care - Admitted Since 7/27/2024       Destination    No services have been selected for the patient.                Durable Medical Equipment Coordination complete.      Service Provider Selected Services Address Phone Fax Patient Preferred    [facility]urable Medical Equipment ,  Oxygen Equipment and Accessories [address] [place] [address] [phone] --              Dialysis/Infusion    No services have been selected for the patient.                Home Medical Care    No services have been selected for the patient.                Therapy    No services have been selected for the patient.                Community Resources    No services have been selected for the patient.                Community & DME    No services have been selected for the patient.                         Final Discharge Disposition Code: 06 - home with home health care

## 2024-08-05 ENCOUNTER — TRANSITIONAL CARE MANAGEMENT TELEPHONE ENCOUNTER (OUTPATIENT)
Dept: CALL CENTER | Facility: HOSPITAL | Age: 64
End: 2024-08-05
Payer: MEDICARE

## 2024-08-05 NOTE — OUTREACH NOTE
Call Center TCM Note      Flowsheet Row Responses   [facility] patient discharged from? [place]   Does the patient have one of the following disease processes/diagnoses(primary or secondary)? COPD   TCM attempt successful? No   Unsuccessful attempts Attempt 2            Marquita Angel RN    8/5/2024, 11:51 EDT

## 2024-08-05 NOTE — OUTREACH NOTE
Call Center TCM Note      Flowsheet Row Responses   [facility] [place] patient discharged from? [place]   Does the patient have one of the following disease processes/diagnoses(primary or secondary)? COPD   TCM attempt successful? No  [no verbal release on file, CM notes indicate SO involved in POC during admission]   Unsuccessful attempts Attempt 1  [attempted home, cell as wel as SO]            Marquita Angel RN    8/5/2024, 09:13 EDT

## 2024-08-06 ENCOUNTER — TRANSITIONAL CARE MANAGEMENT TELEPHONE ENCOUNTER (OUTPATIENT)
Dept: CALL CENTER | Facility: HOSPITAL | Age: 64
End: 2024-08-06
Payer: MEDICARE

## 2024-08-06 NOTE — OUTREACH NOTE
Call Center TCM Note      Flowsheet Row Responses   [facility] patient discharged from? [place]   Does the patient have one of the following disease processes/diagnoses(primary or secondary)? COPD   TCM attempt successful? Yes   Call start time 1214   Call end time 1218   Discharge diagnosis COPD with acute exacerbation   Is patient permission given to speak with other caregiver? Yes   List who call center can speak with significant other- [place]   Person spoke with today (if not patient) and relationship significant other   Does the patient have all medications ordered at discharge? Yes   Is the patient taking all medications as directed (includes completed medication regime)? Yes   Comments Significant other states patient will be seeing their PCP close to where they live   Does the patient have an appointment with their PCP within 7-14 days of discharge? Yes   What is the Home health agency?  [facility]   Has home health visited the patient within 72 hours of discharge? Yes   Home health comments significant other states HH came but patient declined services   Pulse Ox monitoring Intermittent   Pulse Ox device source Patient   O2 Sat comments O2 sats are 97% on 3L of O2   O2 Sat: education provided Sat levels, Monitoring frequency, When to seek care   O2 Sat education comments Advised if O2 sats drop below 90% to seek urgent, significant other verbalized understanding   Psychosocial issues? No   Did the patient receive a copy of their discharge instructions? Yes   Nursing interventions Reviewed instructions with patient   What is the patient's perception of their health status since discharge? Improving   Nursing Interventions Nurse provided patient education   Is the patient/caregiver able to teach back the hierarchy of who to call/visit for symptoms/problems? PCP, Specialist, Home health nurse, Urgent Care, ED, 911 Yes   Patient reports what zone on this call? Green Zone   Green Zone Reports doing  well, Breathing without shortness of breath   Green Zone interventions: Take daily medications, Use oxygen as prescribed   TCM call completed? Yes   Wrap up additional comments Per significant other, patient is doing well, no questions, asked that new patient appt with [facility] be cancelled, states they have a PCP close to [facility] where they live and that they would not go all the way to [place] for a PCP.   Call end time 1218   Would this patient benefit from a Referral to [facility] Social Work? No   Is the patient interested in additional calls from an ambulatory ? No            Amanda James RN    8/6/2024, 12:19 EDT